# Patient Record
Sex: MALE | Race: WHITE | Employment: FULL TIME | URBAN - NONMETROPOLITAN AREA
[De-identification: names, ages, dates, MRNs, and addresses within clinical notes are randomized per-mention and may not be internally consistent; named-entity substitution may affect disease eponyms.]

---

## 2017-10-05 NOTE — PROGRESS NOTES
NPO after midnight  Bring drivers license and insurance information  Wear comfortable clean clothes  Shower morning of and night before with liquid antibacterial soap  Remove jewelry   May have to stay overnight if have PTCA/stent  Bring medications in original bottles  Made aware of visitors limit to 1 at a time  Follow all instructions given by your physician   needed at discharge    PT USES CPAP AT [facility]

## 2017-10-10 RX ORDER — DIPHENHYDRAMINE HCL 25 MG
25 TABLET ORAL
Status: CANCELLED | OUTPATIENT
Start: 2017-10-10 | End: 2017-10-10

## 2017-10-11 ENCOUNTER — HOSPITAL ENCOUNTER (OUTPATIENT)
Dept: INPATIENT UNIT | Age: 52
Discharge: HOME | End: 2017-10-11
Attending: INTERNAL MEDICINE | Admitting: INTERNAL MEDICINE
Payer: COMMERCIAL

## 2017-10-11 ENCOUNTER — APPOINTMENT (OUTPATIENT)
Dept: CARDIAC CATH/INVASIVE PROCEDURES | Age: 52
End: 2017-10-11
Attending: INTERNAL MEDICINE
Payer: COMMERCIAL

## 2017-10-11 VITALS
WEIGHT: 315 LBS | OXYGEN SATURATION: 97 % | HEIGHT: 74 IN | TEMPERATURE: 98.5 F | HEART RATE: 72 BPM | SYSTOLIC BLOOD PRESSURE: 129 MMHG | BODY MASS INDEX: 40.43 KG/M2 | DIASTOLIC BLOOD PRESSURE: 74 MMHG | RESPIRATION RATE: 17 BRPM

## 2017-10-11 PROBLEM — R07.9 CHEST PAIN AT REST: Status: ACTIVE | Noted: 2017-10-11

## 2017-10-11 PROBLEM — R94.39 ABNORMAL NUCLEAR STRESS TEST: Status: ACTIVE | Noted: 2017-10-11

## 2017-10-11 LAB
ABO: NORMAL
ALBUMIN SERPL-MCNC: 3.6 G/DL (ref 3.5–5.1)
ALP BLD-CCNC: 62 U/L (ref 38–126)
ALT SERPL-CCNC: 16 U/L (ref 11–66)
ANION GAP SERPL CALCULATED.3IONS-SCNC: 9 MEQ/L (ref 8–16)
ANTIBODY SCREEN: NORMAL
AST SERPL-CCNC: 14 U/L (ref 5–40)
BILIRUB SERPL-MCNC: 0.7 MG/DL (ref 0.3–1.2)
BUN BLDV-MCNC: 22 MG/DL (ref 7–22)
CALCIUM SERPL-MCNC: 8.5 MG/DL (ref 8.5–10.5)
CHLORIDE BLD-SCNC: 104 MEQ/L (ref 98–111)
CHOLESTEROL, TOTAL: 127 MG/DL (ref 100–199)
CO2: 27 MEQ/L (ref 23–33)
CREAT SERPL-MCNC: 0.6 MG/DL (ref 0.4–1.2)
GFR SERPL CREATININE-BSD FRML MDRD: > 90 ML/MIN/1.73M2
GLUCOSE BLD-MCNC: 92 MG/DL (ref 70–108)
HCT VFR BLD CALC: 41.6 % (ref 42–52)
HDLC SERPL-MCNC: 31 MG/DL
HEMOGLOBIN: 13.9 GM/DL (ref 14–18)
INR BLD: 1.04 (ref 0.85–1.13)
LDL CHOLESTEROL CALCULATED: 80 MG/DL
MCH RBC QN AUTO: 28.9 PG (ref 27–31)
MCHC RBC AUTO-ENTMCNC: 33.5 GM/DL (ref 33–37)
MCV RBC AUTO: 86.2 FL (ref 80–94)
PDW BLD-RTO: 15.1 % (ref 11.5–14.5)
PLATELET # BLD: 258 THOU/MM3 (ref 130–400)
PMV BLD AUTO: 8.2 MCM (ref 7.4–10.4)
POTASSIUM SERPL-SCNC: 4.5 MEQ/L (ref 3.5–5.2)
RBC # BLD: 4.82 MILL/MM3 (ref 4.7–6.1)
RH FACTOR: NORMAL
SODIUM BLD-SCNC: 140 MEQ/L (ref 135–145)
TOTAL PROTEIN: 6.5 G/DL (ref 6.1–8)
TRIGL SERPL-MCNC: 79 MG/DL (ref 0–199)
WBC # BLD: 6.6 THOU/MM3 (ref 4.8–10.8)

## 2017-10-11 PROCEDURE — 2580000003 HC RX 258: Performed by: INTERNAL MEDICINE

## 2017-10-11 PROCEDURE — C1753 CATH, INTRAVAS ULTRASOUND: HCPCS

## 2017-10-11 PROCEDURE — 6360000002 HC RX W HCPCS

## 2017-10-11 PROCEDURE — 93005 ELECTROCARDIOGRAM TRACING: CPT | Performed by: INTERNAL MEDICINE

## 2017-10-11 PROCEDURE — 36415 COLL VENOUS BLD VENIPUNCTURE: CPT

## 2017-10-11 PROCEDURE — A6258 TRANSPARENT FILM >16<=48 IN: HCPCS

## 2017-10-11 PROCEDURE — 80053 COMPREHEN METABOLIC PANEL: CPT

## 2017-10-11 PROCEDURE — 86901 BLOOD TYPING SEROLOGIC RH(D): CPT

## 2017-10-11 PROCEDURE — 93458 L HRT ARTERY/VENTRICLE ANGIO: CPT | Performed by: INTERNAL MEDICINE

## 2017-10-11 PROCEDURE — 2780000010 HC IMPLANT OTHER

## 2017-10-11 PROCEDURE — 86850 RBC ANTIBODY SCREEN: CPT

## 2017-10-11 PROCEDURE — C1894 INTRO/SHEATH, NON-LASER: HCPCS

## 2017-10-11 PROCEDURE — 96360 HYDRATION IV INFUSION INIT: CPT

## 2017-10-11 PROCEDURE — 85610 PROTHROMBIN TIME: CPT

## 2017-10-11 PROCEDURE — 80061 LIPID PANEL: CPT

## 2017-10-11 PROCEDURE — C1769 GUIDE WIRE: HCPCS

## 2017-10-11 PROCEDURE — 85027 COMPLETE CBC AUTOMATED: CPT

## 2017-10-11 PROCEDURE — 2500000003 HC RX 250 WO HCPCS

## 2017-10-11 PROCEDURE — 86900 BLOOD TYPING SEROLOGIC ABO: CPT

## 2017-10-11 RX ORDER — ACETAMINOPHEN 325 MG/1
650 TABLET ORAL EVERY 4 HOURS PRN
Status: DISCONTINUED | OUTPATIENT
Start: 2017-10-11 | End: 2017-10-11 | Stop reason: HOSPADM

## 2017-10-11 RX ORDER — ATROPINE SULFATE 0.4 MG/ML
0.5 AMPUL (ML) INJECTION
Status: DISCONTINUED | OUTPATIENT
Start: 2017-10-11 | End: 2017-10-11 | Stop reason: HOSPADM

## 2017-10-11 RX ORDER — SODIUM CHLORIDE 9 MG/ML
100 INJECTION, SOLUTION INTRAVENOUS CONTINUOUS
Status: DISCONTINUED | OUTPATIENT
Start: 2017-10-11 | End: 2017-10-11 | Stop reason: HOSPADM

## 2017-10-11 RX ORDER — SODIUM CHLORIDE 0.9 % (FLUSH) 0.9 %
10 SYRINGE (ML) INJECTION EVERY 12 HOURS SCHEDULED
Status: DISCONTINUED | OUTPATIENT
Start: 2017-10-11 | End: 2017-10-11 | Stop reason: HOSPADM

## 2017-10-11 RX ORDER — ASPIRIN 325 MG
325 TABLET ORAL ONCE
Status: DISCONTINUED | OUTPATIENT
Start: 2017-10-11 | End: 2017-10-11 | Stop reason: HOSPADM

## 2017-10-11 RX ORDER — SODIUM CHLORIDE 0.9 % (FLUSH) 0.9 %
10 SYRINGE (ML) INJECTION PRN
Status: DISCONTINUED | OUTPATIENT
Start: 2017-10-11 | End: 2017-10-11 | Stop reason: HOSPADM

## 2017-10-11 RX ORDER — SODIUM CHLORIDE 0.9 % (FLUSH) 0.9 %
10 SYRINGE (ML) INJECTION PRN
Status: DISCONTINUED | OUTPATIENT
Start: 2017-10-11 | End: 2017-10-11 | Stop reason: SDUPTHER

## 2017-10-11 RX ORDER — NAPROXEN SODIUM 550 MG/1
550 TABLET ORAL 4 TIMES DAILY PRN
COMMUNITY

## 2017-10-11 RX ORDER — ONDANSETRON 2 MG/ML
4 INJECTION INTRAMUSCULAR; INTRAVENOUS EVERY 6 HOURS PRN
Status: DISCONTINUED | OUTPATIENT
Start: 2017-10-11 | End: 2017-10-11 | Stop reason: HOSPADM

## 2017-10-11 RX ORDER — SODIUM CHLORIDE 0.9 % (FLUSH) 0.9 %
10 SYRINGE (ML) INJECTION EVERY 12 HOURS SCHEDULED
Status: DISCONTINUED | OUTPATIENT
Start: 2017-10-11 | End: 2017-10-11 | Stop reason: SDUPTHER

## 2017-10-11 RX ORDER — SODIUM CHLORIDE 9 MG/ML
INJECTION, SOLUTION INTRAVENOUS CONTINUOUS
Status: DISCONTINUED | OUTPATIENT
Start: 2017-10-11 | End: 2017-10-11 | Stop reason: HOSPADM

## 2017-10-11 RX ORDER — DIPHENHYDRAMINE HYDROCHLORIDE 50 MG/ML
50 INJECTION INTRAMUSCULAR; INTRAVENOUS ONCE
Status: DISCONTINUED | OUTPATIENT
Start: 2017-10-11 | End: 2017-10-11

## 2017-10-11 RX ADMIN — SODIUM CHLORIDE: 9 INJECTION, SOLUTION INTRAVENOUS at 06:57

## 2017-10-11 NOTE — PLAN OF CARE
Problem: Preoperative Routine:  Goal: Risk for injury before, during, or after surgery or procedure will decrease  Risk for injury before, during, or after surgery or procedure will decrease   Outcome: Completed Date Met: 10/11/17  Procedure tolerated well    Problem: Discharge Planning:  Goal: Discharged to appropriate level of care  Discharged to appropriate level of care   Outcome: Completed Date Met: 10/11/17  Discharged home    Problem: Falls - Risk of  Goal: Absence of falls  Outcome: Completed Date Met: 10/11/17  No falls

## 2017-10-11 NOTE — H&P
[address] [address]   Sedation/Analgesia History and Physical    Pt Name: Chantel Agee  MRN: [id number]  YOB: 1965  Provider Performing Procedure: Meghna Escobedo MD  Primary Care Physician: Nadia Davis MD      Pre-Procedure: Chest pain, possible coronary artery disease, abnormal stress test    Consent: I have discussed with the patient and/or the patient representative the indication, alternatives, and the possible risks and/or complications of the planned procedure and the anesthesia methods. The patient and/or representative appear to understand and agree to proceed. Medical History:   has a past medical history of Asthma; Atrial fibrillation ([address] GERD (gastroesophageal reflux disease); Hypertension; and Sleep apnea. .    Surgical History:     has a past surgical history that includes Colonoscopy (09/20/2016). Allergies: Allergies as of 10/11/2017    (No Known Allergies)       Medications:   Coumadin use last 5 days:  No  Antiplatelet drug therapy use last 5 days:  Yes  Other anticoagulant use last 5 days:  No   sodium chloride flush  10 mL Intravenous 2 times per day    aspirin  325 mg Oral Once    diphenhydrAMINE  50 mg Intravenous Once    hydrocortisone sodium succinate PF  200 mg Intravenous Once     Prior to Admission medications    Medication Sig Start Date End Date Taking? Authorizing Provider   naproxen (NAPROSYN) 375 MG tablet Take 375 mg by mouth 2 times daily (with meals)    Historical Provider, MD   fluticasone-salmeterol (ADVAIR) 250-50 MCG/DOSE AEPB Inhale 1 puff into the lungs 2 times daily. Historical Provider, MD   metoprolol (TOPROL-XL) 50 MG XL tablet Take 75 mg by mouth daily     Historical Provider, MD   omeprazole (PRILOSEC) 20 MG capsule Take 20 mg by mouth Daily. Historical Provider, MD   aspirin 325 MG EC tablet Take 325 mg by mouth daily.     Historical Provider, MD   naproxen (NAPROSYN) 375 MG tablet Take 1 tablet by mouth 2 times daily

## 2017-10-11 NOTE — PROGRESS NOTES
Inpatient Cardiac Rehabilitation Consult    Received consult for Phase II Cardiac Rehabilitation. Post procedure notes state that only a LHC was done. Does not qualify for CR at this time. Will follow though.

## 2017-10-11 NOTE — PROGRESS NOTES
Returned to [phone]. Monitor attached showing SB. Vasc-band to right wrist, hemostasis maintained.   0.9nss infusing with approx 800ml remaining

## 2017-10-11 NOTE — PROGRESS NOTES
Pt admitted to  2E16 ambulatory for cardiac cath  Pt NPO. Patient accompanied by family  Vital signs obtained. Assessment and data collection initiated. Oriented to room. Policies and procedures for 2E explained   All questions answered with no further questions at this time. Fall prevention and safety precautions discussed with patient.

## 2017-10-12 LAB
EKG ATRIAL RATE: 64 BPM
EKG P AXIS: 41 DEGREES
EKG P-R INTERVAL: 216 MS
EKG Q-T INTERVAL: 392 MS
EKG QRS DURATION: 88 MS
EKG QTC CALCULATION (BAZETT): 404 MS
EKG R AXIS: 77 DEGREES
EKG T AXIS: 37 DEGREES
EKG VENTRICULAR RATE: 64 BPM

## 2017-10-16 NOTE — PROCEDURES
second  diagonal.    This patient's ventriculogram showed a preserved systolic function of  the left ventricle, ejection fraction about 55% to 60%. Left  ventricular end-diastolic pressure was 29, consistent with severe  diastolic dysfunction of the left ventricle. No mitral valve  regurgitation. No gradient across the aortic valve. IMPRESSION:  1. Procedure performed under IV conscious sedation. No acute  complications from the procedure. 2.  Preserved systolic function of the left ventricle, ejection  fraction around 55%. No mitral regurgitation. No gradient across the  aortic valve. 3.  Diastolic dysfunction of the left ventricle, left ventricular  end-diastolic pressure was 29.  4.  Nonobstructive disease about 40% to 50% in mid LAD. 5.  Patent RCA. 6.  Patent circumflex. 7.  Successful deployment of the Angio-Seal closure device. At this point, we recommend to continue with aggressive medical  treatment, risk factor modification, and periodic followup.         Xenia Moreno M.D.    D: 10/16/2017 6:44:40       T: 10/16/2017 8:21:41     [email or web address]  Job#: [id number]     Doc#: [id number]

## 2022-04-13 PROBLEM — I10 HYPERTENSION: Status: ACTIVE | Noted: 2022-04-13

## 2022-04-13 PROBLEM — K21.9 ACID REFLUX: Status: ACTIVE | Noted: 2022-04-13

## 2022-05-31 RX ORDER — METOPROLOL SUCCINATE 50 MG/1
TABLET, EXTENDED RELEASE ORAL
Qty: 90 TABLET | Refills: 4 | OUTPATIENT
Start: 2022-05-31

## 2023-01-10 ENCOUNTER — OFFICE VISIT (OUTPATIENT)
Dept: CARDIOLOGY CLINIC | Age: 58
End: 2023-01-10
Payer: COMMERCIAL

## 2023-01-10 VITALS
RESPIRATION RATE: 18 BRPM | SYSTOLIC BLOOD PRESSURE: 166 MMHG | HEART RATE: 57 BPM | HEIGHT: 74 IN | DIASTOLIC BLOOD PRESSURE: 97 MMHG | BODY MASS INDEX: 39.14 KG/M2 | WEIGHT: 305 LBS

## 2023-01-10 DIAGNOSIS — I10 HYPERTENSION, UNSPECIFIED TYPE: Primary | ICD-10-CM

## 2023-01-10 PROCEDURE — 93000 ELECTROCARDIOGRAM COMPLETE: CPT | Performed by: INTERNAL MEDICINE

## 2023-01-10 PROCEDURE — 3077F SYST BP >= 140 MM HG: CPT | Performed by: INTERNAL MEDICINE

## 2023-01-10 PROCEDURE — 3080F DIAST BP >= 90 MM HG: CPT | Performed by: INTERNAL MEDICINE

## 2023-01-10 PROCEDURE — 99214 OFFICE O/P EST MOD 30 MIN: CPT | Performed by: INTERNAL MEDICINE

## 2023-01-10 RX ORDER — LOSARTAN POTASSIUM 50 MG/1
50 TABLET ORAL DAILY
Qty: 90 TABLET | Refills: 3 | Status: SHIPPED | OUTPATIENT
Start: 2023-01-10

## 2023-01-10 RX ORDER — SPIRONOLACTONE 25 MG/1
50 TABLET ORAL DAILY
Qty: 90 TABLET | Refills: 3 | Status: SHIPPED | OUTPATIENT
Start: 2023-01-10

## 2023-01-10 RX ORDER — METOPROLOL SUCCINATE 50 MG/1
75 TABLET, EXTENDED RELEASE ORAL DAILY
Qty: 90 TABLET | Refills: 3 | Status: SHIPPED | OUTPATIENT
Start: 2023-01-10

## 2023-01-10 ASSESSMENT — ENCOUNTER SYMPTOMS
VOICE CHANGE: 0
ANAL BLEEDING: 0
WHEEZING: 0
ABDOMINAL DISTENTION: 0
BLOOD IN STOOL: 0
APNEA: 0
ABDOMINAL PAIN: 0
CHOKING: 0
COUGH: 0
TROUBLE SWALLOWING: 0
COLOR CHANGE: 0
VOMITING: 0
CHEST TIGHTNESS: 0
SHORTNESS OF BREATH: 1
NAUSEA: 0
STRIDOR: 0

## 2023-01-10 NOTE — PROGRESS NOTES
[address] [facility]  [place] [address]  Dept: [phone]  Loc: [phone]     1/10/2023       Narcisa Staggers is here today for   Chief Complaint   Patient presents with    Follow-up           Referring Physician:  No ref. provider found     Patient Active Problem List   Diagnosis    Chest pain at rest    Abnormal nuclear stress test    Hypertension    Acid reflux       Review of Systems   Constitutional:  Negative for activity change, appetite change, fatigue, fever and unexpected weight change. HENT:  Negative for congestion, trouble swallowing and voice change. Eyes:  Negative for visual disturbance. Respiratory:  Positive for shortness of breath. Negative for apnea, cough, choking, chest tightness, wheezing and stridor. Cardiovascular:  Positive for palpitations. Negative for chest pain and leg swelling. Gastrointestinal:  Negative for abdominal distention, abdominal pain, anal bleeding, blood in stool, nausea and vomiting. Endocrine: Negative for cold intolerance and heat intolerance. Genitourinary:  Negative for hematuria. Musculoskeletal:  Negative for arthralgias, gait problem, joint swelling and myalgias. Skin:  Negative for color change and rash. Allergic/Immunologic: Negative for environmental allergies and food allergies. Neurological:  Negative for dizziness, tremors, syncope, facial asymmetry, weakness, light-headedness, numbness and headaches. Hematological:  Does not bruise/bleed easily. Psychiatric/Behavioral:  Negative for agitation, behavioral problems and sleep disturbance.        Past Medical History:   Diagnosis Date    Asthma     Atrial fibrillation (HCC)     GERD (gastroesophageal reflux disease)     Hyperlipidemia     Hypertension     Obesity     Sleep apnea        No Known Allergies    Current Outpatient Medications   Medication Sig Dispense Refill    metoprolol succinate (TOPROL XL) 50 MG extended release tablet Take 1.5 tablets by mouth daily 90 tablet 3    losartan (COZAAR) 50 MG tablet Take 1 tablet by mouth daily 90 tablet 3    spironolactone (ALDACTONE) 25 MG tablet Take 2 tablets by mouth daily 90 tablet 3    Aspirin 81 MG CAPS 81 mg (Patient not taking: Reported on 1/10/2023)      aspirin 325 MG EC tablet Take 325 mg by mouth daily. (Patient not taking: Reported on 1/10/2023)       No current facility-administered medications for this visit. Social History     Socioeconomic History    Marital status:      Spouse name: None    Number of children: None    Years of education: None    Highest education level: None   Tobacco Use    Smoking status: Never    Smokeless tobacco: Never   Substance and Sexual Activity    Alcohol use: Yes     Comment: RARELY    Drug use: No    Sexual activity: Yes       History reviewed. No pertinent family history. Blood pressure (!) 166/97, pulse 57, resp. rate 18, height 6' 2\" (1.88 m), weight (!) 305 lb (138.3 kg).     Physical Exam:    General Appearance: alert and oriented to person, place and time, in no acute distress  Cardiovascular: normal rate, regular rhythm, normal S1 and S2, no murmurs, rubs, clicks, or gallops, distal pulses intact, no carotid bruits, no JVD  Pulmonary/Chest: clear to auscultation bilaterally- no wheezes, rales or rhonchi, normal air movement, no respiratory distress  Abdomen: soft, non-tender, non-distended, normal bowel sounds, no masses   Extremities: no cyanosis, clubbing or edema, pulse   Skin: warm and dry, no rash or erythema  Head: normocephalic and atraumatic  Eyes: pupils equal, round, and reactive to light  Neck: supple and non-tender without mass, no thyromegaly   Musculoskeletal: normal range of motion, no joint swelling, deformity or tenderness  Neurological: alert, oriented, normal speech, no focal findings or movement disorder noted    Lab Data:    Cardiac Enzymes:  No results for input(s): CKTOTAL, CKMB, CKMBINDEX, TROPONINI in the last 72 hours. CBC:   Lab Results   Component Value Date/Time    WBC 6.6 10/11/2017 06:42 AM    RBC 4.82 10/11/2017 06:42 AM    HGB 13.9 10/11/2017 06:42 AM    HCT 41.6 10/11/2017 06:42 AM     10/11/2017 06:42 AM       CMP:    Lab Results   Component Value Date/Time     10/11/2017 06:42 AM    K 4.5 10/11/2017 06:42 AM     10/11/2017 06:42 AM    CO2 27 10/11/2017 06:42 AM    BUN 22 10/11/2017 06:42 AM    CREATININE 0.6 10/11/2017 06:42 AM    LABGLOM >90 10/11/2017 06:42 AM    GLUCOSE 92 10/11/2017 06:42 AM    CALCIUM 8.5 10/11/2017 06:42 AM       Hepatic Function Panel:    Lab Results   Component Value Date/Time    ALKPHOS 62 10/11/2017 06:42 AM    ALT 16 10/11/2017 06:42 AM    AST 14 10/11/2017 06:42 AM    PROT 6.5 10/11/2017 06:42 AM    BILITOT 0.7 10/11/2017 06:42 AM    LABALBU 3.6 10/11/2017 06:42 AM       Magnesium:  No results found for: MG    PT/INR:    Lab Results   Component Value Date/Time    INR 1.04 10/11/2017 06:42 AM       HgBA1c:  No results found for: LABA1C    FLP:    Lab Results   Component Value Date/Time    TRIG 79 10/11/2017 06:42 AM    HDL 31 10/11/2017 06:42 AM    LDLCALC 80 10/11/2017 06:42 AM       TSH:  No results found for: TSH     Diagnosis Orders   1. Hypertension, unspecified type  41768 - [place] ELECTROCARDIOGRAM, COMPLETE    Basic Metabolic Panel           Assessment/Plan    Carl is a 65years old gentleman who has a history of obesity and gastric bypass surgery he gained about 19 pounds since his last visit. His blood pressure is going high. He has episode of atrial fibrillation. The patient had history of sleep apnea has not been using his CPAP. He denies chest pain. Say we had a long discussion about the importance of compliance diet exercise and risk factor modifications. The patient was giving Cozaar 50 mg and Aldactone 25 mg he will have a BMP in 4 weeks and he will be seen back in 8 weeks.   He is to keep a record of the if you develop with the atrial fibs and to keep a record of his blood pressure readings. He was advised about utilizing the CPAP again he verbalized he understands and he promised to change his lifestyle and be better and compliance. He will be seen in 8 weeks he is to seek medical attention for any change in clinical condition. His lab work was discussed with him and his EKG findings were discussed with him and he is to seek medical attention for any change in clinical condition thank    Orders Placed This Encounter   Procedures    Basic Metabolic Panel     Standing Status:   Future     Standing Expiration Date:   1/10/2024    93552 - [place] ELECTROCARDIOGRAM, COMPLETE       Return in about 8 weeks (around 3/7/2023) for htn.      Smooth Kamara MD

## 2023-02-17 RX ORDER — SPIRONOLACTONE 25 MG/1
TABLET ORAL
Qty: 90 TABLET | Refills: 3 | OUTPATIENT
Start: 2023-02-17

## 2023-03-06 RX ORDER — METOPROLOL SUCCINATE 50 MG/1
TABLET, EXTENDED RELEASE ORAL
Qty: 145 TABLET | Refills: 3 | Status: SHIPPED | OUTPATIENT
Start: 2023-03-06

## 2023-03-14 ENCOUNTER — OFFICE VISIT (OUTPATIENT)
Dept: CARDIOLOGY CLINIC | Age: 58
End: 2023-03-14
Payer: COMMERCIAL

## 2023-03-14 VITALS
SYSTOLIC BLOOD PRESSURE: 158 MMHG | BODY MASS INDEX: 36.83 KG/M2 | RESPIRATION RATE: 18 BRPM | HEART RATE: 53 BPM | DIASTOLIC BLOOD PRESSURE: 94 MMHG | HEIGHT: 74 IN | WEIGHT: 287 LBS

## 2023-03-14 DIAGNOSIS — I10 HYPERTENSION, UNSPECIFIED TYPE: Primary | ICD-10-CM

## 2023-03-14 DIAGNOSIS — G47.33 OSA (OBSTRUCTIVE SLEEP APNEA): ICD-10-CM

## 2023-03-14 PROCEDURE — 3077F SYST BP >= 140 MM HG: CPT | Performed by: NURSE PRACTITIONER

## 2023-03-14 PROCEDURE — 93000 ELECTROCARDIOGRAM COMPLETE: CPT | Performed by: INTERNAL MEDICINE

## 2023-03-14 PROCEDURE — 3080F DIAST BP >= 90 MM HG: CPT | Performed by: NURSE PRACTITIONER

## 2023-03-14 PROCEDURE — 99214 OFFICE O/P EST MOD 30 MIN: CPT | Performed by: NURSE PRACTITIONER

## 2023-03-14 ASSESSMENT — ENCOUNTER SYMPTOMS
RESPIRATORY NEGATIVE: 1
GASTROINTESTINAL NEGATIVE: 1

## 2023-03-14 NOTE — PROGRESS NOTES
[address] [facility] [address]  Dept: [address]: [phone]           Chief Complaint   Patient presents with    Follow-up       Cardiologist:  Dr. Joseline Pendleton      Today's visit: 3/14/2023    Subjective:    Review of Systems   Constitutional: Negative. Respiratory: Negative. Cardiovascular: Negative. Gastrointestinal: Negative. Musculoskeletal: Negative. Neurological: Negative. Psychiatric/Behavioral: Negative. Past Medical History:   Diagnosis Date    Asthma     Atrial fibrillation (HCC)     GERD (gastroesophageal reflux disease)     Hyperlipidemia     Hypertension     Obesity     Sleep apnea        No Known Allergies    Current Outpatient Medications   Medication Sig Dispense Refill    metoprolol succinate (TOPROL XL) 50 MG extended release tablet TAKE 1 AND 1/2 TABLETS BY MOUTH EVERY DAY (Patient taking differently: 50 mg daily) 145 tablet 3    losartan (COZAAR) 50 MG tablet Take 1 tablet by mouth daily 90 tablet 3    spironolactone (ALDACTONE) 25 MG tablet Take 2 tablets by mouth daily 90 tablet 3    Aspirin 81 MG CAPS 81 mg (Patient not taking: No sig reported)      aspirin 325 MG EC tablet Take 325 mg by mouth daily. (Patient not taking: No sig reported)       No current facility-administered medications for this visit. Social History     Socioeconomic History    Marital status:      Spouse name: None    Number of children: None    Years of education: None    Highest education level: None   Tobacco Use    Smoking status: Never    Smokeless tobacco: Never   Substance and Sexual Activity    Alcohol use: Yes     Comment: RARELY    Drug use: No    Sexual activity: Yes       History reviewed. No pertinent family history. Blood pressure (!) 158/94, pulse 53, resp. rate 18, height 6' 2\" (1.88 m), weight 287 lb (130.2 kg).       Physical Exam  Constitutional: Appearance: Normal appearance. Cardiovascular:      Rate and Rhythm: Normal rate and regular rhythm. Heart sounds: Normal heart sounds. Pulmonary:      Effort: Pulmonary effort is normal.      Breath sounds: Normal breath sounds. Abdominal:      General: Bowel sounds are normal.      Palpations: Abdomen is soft. Musculoskeletal:         General: Normal range of motion. Skin:     General: Skin is warm and dry. Neurological:      General: No focal deficit present. Mental Status: He is alert and oriented to person, place, and time. Psychiatric:         Mood and Affect: Mood normal.         Behavior: Behavior normal.         EKG: normal sinus rhythm, nonspecific ST and T waves changes, unchanged from previous tracings. Diagnosis Orders   1. Hypertension, unspecified type  44838 - [place] ELECTROCARDIOGRAM, COMPLETE      2. AMANDA (obstructive sleep apnea)              Assessment and Plan:  Carl is a pleasant 28-year-old gentleman who presents today for an 8-week follow-up for hypertension. Carl states that he is taking his medication without any problems. He states his blood pressures at home running around 130s over 70s. Today is a little elevated at 158/94. He denies any type of dizziness headaches palpitations or chest discomfort. Carl states that he is not wearing his CPAP as asked by Dr. Angie Barrera last time he knows the consequences of not wearing it 10 to 20 years down the line. We did discuss different options that they have nowadays besides the CPAP. He was encouraged to use a CPAP or seek other options with his pulmonologist.  His lab work was reviewed and it looks well. Uncontrolled HTN -improved blood pressures running 130 over over 70s at home    PAFB -sinus rhythm today not on anticoagulation    AMANDA - not using CPAP -continues to not use his CPAP.   Did discuss different options with him that he can discuss with his pulmonologist.    Salma Acosta is doing well from a cardiac standpoint his blood pressure seems to be well controlled at home. He understands to call us if his blood pressures are above 309 systolic for period of time. He also understands to call us for any chest discomfort. I did agree to see him in 1 years time or sooner if he has any issues. I also encouraged him to go see his pulmonologist to discuss different types of PAP therapy for his sleep apnea. Orders Placed This Encounter   Procedures    Orlean Lover Dr Sonya Ray current treatment plan    There are no Patient Instructions on file for this visit. Return in about 1 year (around 3/14/2024), or if symptoms worsen or fail to improve, for HTN.     Follow up with Dr Doug Monsivais as scheduled or sooner if needed    Erendira Mere, KORY - CNP

## 2023-05-22 RX ORDER — SPIRONOLACTONE 25 MG/1
TABLET ORAL
Qty: 180 TABLET | Refills: 2 | Status: SHIPPED | OUTPATIENT
Start: 2023-05-22

## 2023-08-02 ENCOUNTER — TELEPHONE (OUTPATIENT)
Dept: CARDIOLOGY CLINIC | Age: 58
End: 2023-08-02

## 2023-08-02 DIAGNOSIS — I48.91 ATRIAL FIBRILLATION, UNSPECIFIED TYPE (HCC): Primary | ICD-10-CM

## 2023-08-02 NOTE — TELEPHONE ENCOUNTER
Called Carl to let him know Dr. Charisma Hernandez wanted him to have a 24 hour holter monitor. Patient said that the atrial fibrillation went away. He matty call us if it comes back.

## 2023-08-02 NOTE — TELEPHONE ENCOUNTER
Carl called wanting to get a sooner apt due to atrial fibrilation. Dr. Jennifer Man ordered 24 hour holter monitor.

## 2024-01-05 RX ORDER — LOSARTAN POTASSIUM 50 MG/1
50 TABLET ORAL DAILY
Qty: 90 TABLET | Refills: 0 | Status: SHIPPED | OUTPATIENT
Start: 2024-01-05

## 2024-01-12 ENCOUNTER — OFFICE VISIT (OUTPATIENT)
Dept: FAMILY MEDICINE CLINIC | Age: 59
End: 2024-01-12
Payer: COMMERCIAL

## 2024-01-12 VITALS
WEIGHT: 315 LBS | OXYGEN SATURATION: 97 % | RESPIRATION RATE: 16 BRPM | SYSTOLIC BLOOD PRESSURE: 112 MMHG | TEMPERATURE: 97.9 F | BODY MASS INDEX: 40.43 KG/M2 | HEART RATE: 63 BPM | HEIGHT: 74 IN | DIASTOLIC BLOOD PRESSURE: 72 MMHG

## 2024-01-12 DIAGNOSIS — B00.1 RECURRENT COLD SORES: ICD-10-CM

## 2024-01-12 DIAGNOSIS — H65.01 RIGHT ACUTE SEROUS OTITIS MEDIA, RECURRENCE NOT SPECIFIED: Primary | ICD-10-CM

## 2024-01-12 PROCEDURE — 3078F DIAST BP <80 MM HG: CPT | Performed by: STUDENT IN AN ORGANIZED HEALTH CARE EDUCATION/TRAINING PROGRAM

## 2024-01-12 PROCEDURE — 3074F SYST BP LT 130 MM HG: CPT | Performed by: STUDENT IN AN ORGANIZED HEALTH CARE EDUCATION/TRAINING PROGRAM

## 2024-01-12 PROCEDURE — 99203 OFFICE O/P NEW LOW 30 MIN: CPT | Performed by: STUDENT IN AN ORGANIZED HEALTH CARE EDUCATION/TRAINING PROGRAM

## 2024-01-12 RX ORDER — AMOXICILLIN AND CLAVULANATE POTASSIUM 875; 125 MG/1; MG/1
1 TABLET, FILM COATED ORAL 2 TIMES DAILY
Qty: 14 TABLET | Refills: 0 | Status: SHIPPED | OUTPATIENT
Start: 2024-01-12 | End: 2024-01-19

## 2024-01-12 RX ORDER — VALACYCLOVIR HYDROCHLORIDE 1 G/1
2000 TABLET, FILM COATED ORAL 2 TIMES DAILY
Qty: 20 TABLET | Refills: 1 | Status: SHIPPED | OUTPATIENT
Start: 2024-01-12 | End: 2024-01-17

## 2024-01-12 SDOH — ECONOMIC STABILITY: FOOD INSECURITY: WITHIN THE PAST 12 MONTHS, THE FOOD YOU BOUGHT JUST DIDN'T LAST AND YOU DIDN'T HAVE MONEY TO GET MORE.: NEVER TRUE

## 2024-01-12 SDOH — ECONOMIC STABILITY: HOUSING INSECURITY
IN THE LAST 12 MONTHS, WAS THERE A TIME WHEN YOU DID NOT HAVE A STEADY PLACE TO SLEEP OR SLEPT IN A SHELTER (INCLUDING NOW)?: NO

## 2024-01-12 SDOH — ECONOMIC STABILITY: INCOME INSECURITY: HOW HARD IS IT FOR YOU TO PAY FOR THE VERY BASICS LIKE FOOD, HOUSING, MEDICAL CARE, AND HEATING?: NOT HARD AT ALL

## 2024-01-12 SDOH — ECONOMIC STABILITY: FOOD INSECURITY: WITHIN THE PAST 12 MONTHS, YOU WORRIED THAT YOUR FOOD WOULD RUN OUT BEFORE YOU GOT MONEY TO BUY MORE.: NEVER TRUE

## 2024-01-12 ASSESSMENT — ENCOUNTER SYMPTOMS
COUGH: 1
SINUS PAIN: 0
SINUS PRESSURE: 0
CONSTIPATION: 0
TROUBLE SWALLOWING: 0
SHORTNESS OF BREATH: 0
VOMITING: 0
DIARRHEA: 0
NAUSEA: 0
SORE THROAT: 0

## 2024-01-12 NOTE — PROGRESS NOTES
[facility] PROFESSIONAL [facility]S  [facility]  [address] [facility] [address]  [phone]     Cral Paulson is a 58 y.o. male who presents today for:  Chief Complaint   Patient presents with    Congestion     3-4 days, congestion, cough.  Denies body aches and hot flashes.  Has taken ibuprofen and Advil.     Otalgia     Right ear pain and lost hearing       Assessment/Plan:     Carl was seen today for congestion and otalgia.    Diagnoses and all orders for this visit:    Right acute serous otitis media, recurrence not specified  -     amoxicillin-clavulanate (AUGMENTIN) 875-125 MG per tablet; Take 1 tablet by mouth 2 times daily for 7 days    Recurrent cold sores  -     valACYclovir (VALTREX) 1 g tablet; Take 2 tablets by mouth 2 times daily for 5 days      Ear exam concerning for otitis media on the right side, will prescribe antibiotics as above.  Otherwise recommended supportive care as below    Symptomatic therapy suggested: gargle for sore throat, use mist at bedside for congestion.  Apply facial warm packs for sinus pain. Recommend increased hydration, small frequent meals, and resting when able.     For cough/congestion either Mucinex DM or Robitussin DM, take dose as recommended on bottle.     For ear fullness/drainage/post nasal drip Flonase 2 puffs per nostril twice a day for one week then once at night for one week.    For ear pain/fullness can also ask for Sudafed behind the counter at the pharmacy. I recommend taking every 12 hours until ear pain/hearing improves. Popping is a good sign.        No follow-ups on file.      Medications Prescribed:  Orders Placed This Encounter   Medications    amoxicillin-clavulanate (AUGMENTIN) 875-125 MG per tablet     Sig: Take 1 tablet by mouth 2 times daily for 7 days     Dispense:  14 tablet     Refill:  0    valACYclovir (VALTREX) 1 g tablet     Sig: Take 2 tablets by mouth 2 times daily for 5 days     Dispense:  20 tablet

## 2024-01-12 NOTE — PATIENT INSTRUCTIONS
Symptomatic therapy suggested: gargle for sore throat, use mist at bedside for congestion.  Apply facial warm packs for sinus pain. Recommend increased hydration, small frequent meals, and resting when able.     For cough/congestion either Mucinex DM or Robitussin DM, take dose as recommended on bottle.     For ear fullness/drainage/post nasal drip Flonase 2 puffs per nostril twice a day for one week then once at night for one week.    For ear pain/fullness can also ask for Sudafed behind the counter at the pharmacy. I recommend taking every 12 hours until ear pain/hearing improves. Popping is a good sign.

## 2024-02-12 RX ORDER — SPIRONOLACTONE 25 MG/1
TABLET ORAL
Qty: 180 TABLET | Refills: 0 | Status: SHIPPED | OUTPATIENT
Start: 2024-02-12

## 2024-02-29 ENCOUNTER — TELEPHONE (OUTPATIENT)
Dept: CARDIOLOGY CLINIC | Age: 59
End: 2024-02-29

## 2024-02-29 DIAGNOSIS — I48.91 ATRIAL FIBRILLATION, UNSPECIFIED TYPE (HCC): Primary | ICD-10-CM

## 2024-02-29 DIAGNOSIS — R07.9 CHEST PAIN AT REST: ICD-10-CM

## 2024-02-29 DIAGNOSIS — I10 HYPERTENSION, UNSPECIFIED TYPE: ICD-10-CM

## 2024-02-29 NOTE — TELEPHONE ENCOUNTER
Pt has a BMP in chart.  BMP .  Will place new labs and fax to [facility].    Spoke with pt.  Pt voiced understanding.

## 2024-02-29 NOTE — TELEPHONE ENCOUNTER
Patient had been a patient with Dr Miles and he is coming in to get established with Dr Hugo on 3/19/2024.  He thought Dr Miles had given him blood work orders but the does not have them.  He is asking if blood work orders can be faxed to [facility] in [place] for him to have prior to the appt? Or does he have to wait until he is seen?  Please advise.

## 2024-03-19 ENCOUNTER — OFFICE VISIT (OUTPATIENT)
Dept: CARDIOLOGY CLINIC | Age: 59
End: 2024-03-19
Payer: COMMERCIAL

## 2024-03-19 VITALS
WEIGHT: 314 LBS | DIASTOLIC BLOOD PRESSURE: 70 MMHG | SYSTOLIC BLOOD PRESSURE: 134 MMHG | HEIGHT: 74 IN | BODY MASS INDEX: 40.3 KG/M2 | HEART RATE: 67 BPM

## 2024-03-19 DIAGNOSIS — I48.91 ATRIAL FIBRILLATION, UNSPECIFIED TYPE (HCC): Primary | ICD-10-CM

## 2024-03-19 PROCEDURE — 99204 OFFICE O/P NEW MOD 45 MIN: CPT | Performed by: INTERNAL MEDICINE

## 2024-03-19 PROCEDURE — 93000 ELECTROCARDIOGRAM COMPLETE: CPT | Performed by: INTERNAL MEDICINE

## 2024-03-19 PROCEDURE — 3075F SYST BP GE 130 - 139MM HG: CPT | Performed by: INTERNAL MEDICINE

## 2024-03-19 PROCEDURE — 3078F DIAST BP <80 MM HG: CPT | Performed by: INTERNAL MEDICINE

## 2024-03-19 RX ORDER — SPIRONOLACTONE 25 MG/1
50 TABLET ORAL DAILY
Qty: 180 TABLET | Refills: 3 | Status: SHIPPED | OUTPATIENT
Start: 2024-03-19

## 2024-03-19 RX ORDER — ASPIRIN 81 MG/1
81 TABLET ORAL DAILY
Qty: 90 TABLET | Refills: 0 | Status: SHIPPED | OUTPATIENT
Start: 2024-03-19

## 2024-03-19 RX ORDER — LOSARTAN POTASSIUM 50 MG/1
50 TABLET ORAL DAILY
Qty: 90 TABLET | Refills: 3 | Status: SHIPPED | OUTPATIENT
Start: 2024-03-19

## 2024-03-19 RX ORDER — MELOXICAM 15 MG/1
15 TABLET ORAL DAILY
COMMUNITY
Start: 2024-02-01

## 2024-03-19 RX ORDER — METOPROLOL SUCCINATE 50 MG/1
50 TABLET, EXTENDED RELEASE ORAL DAILY
Qty: 90 TABLET | Refills: 3 | Status: SHIPPED | OUTPATIENT
Start: 2024-03-19

## 2024-03-19 NOTE — PROGRESS NOTES
[facility] [place] [facility]  [facility] CARDIOLOGY  [address]  [address]  [facility] [address]  Dept: [phone]  Dept Fax: [phone]  Loc: [phone]    Visit Date: 3/19/2024    Mr. Paulson is a 58 y.o. male  who presented for:  New patient   Establish cardiac care   HTN  A fib   cad  HPI:   HPI   Carl Paulson is a pleasant 58 year old male patient who  has a past medical history of Asthma, Atrial fibrillation (HCC), GERD (gastroesophageal reflux disease), Hyperlipidemia, Hypertension, Obesity, and Sleep apnea. He used to follow with Dr Miles, wishes to establish cardiac care.  Echocardiogram in 2017 revealed a preserved EF. LHC in 2017 revealed nonobstructive CAD. He has h/o paroxysmal atrial fibrillation, not on OAC, chadsvasc score 1, no know recent recurrence. Denies h/o TIA or CVA. Patient denies chest pain, shortness of breath, dyspnea on exertion. No palpitations.       Current Outpatient Medications:     spironolactone (ALDACTONE) 25 MG tablet, TAKE 2 TABLETS BY MOUTH EVERY DAY, Disp: 180 tablet, Rfl: 0    losartan (COZAAR) 50 MG tablet, TAKE 1 TABLET BY MOUTH EVERY DAY, Disp: 90 tablet, Rfl: 0    metoprolol succinate (TOPROL XL) 50 MG extended release tablet, TAKE 1 AND 1/2 TABLETS BY MOUTH EVERY DAY (Patient taking differently: 1 tablet daily), Disp: 145 tablet, Rfl: 3    aspirin 325 MG EC tablet, Take 325 mg by mouth daily. (Patient not taking: No sig reported), Disp: , Rfl:     Past Medical History  Carl  has a past medical history of Asthma, Atrial fibrillation (HCC), GERD (gastroesophageal reflux disease), Hyperlipidemia, Hypertension, Obesity, and Sleep apnea.    Social History  Carl  reports that he has never smoked. He has never used smokeless tobacco. He reports current alcohol use. He reports that he does not use drugs.    Family History  Carl family history is not on file.    Past Surgical History   Past Surgical History:   Procedure Laterality Date

## 2024-03-19 NOTE — PROGRESS NOTES
New patient here for check up former Dr. Miles pt     Pt states no c/o     EKG done today     Pt states is taking metoprolol 50 daily

## 2024-04-04 RX ORDER — METOPROLOL SUCCINATE 50 MG/1
TABLET, EXTENDED RELEASE ORAL
Qty: 145 TABLET | Refills: 3 | OUTPATIENT
Start: 2024-04-04

## 2025-02-24 ENCOUNTER — TELEPHONE (OUTPATIENT)
Dept: ADMINISTRATIVE | Age: 60
End: 2025-02-24

## 2025-02-24 DIAGNOSIS — I10 HYPERTENSION, UNSPECIFIED TYPE: Primary | ICD-10-CM

## 2025-02-24 DIAGNOSIS — R07.9 CHEST PAIN AT REST: ICD-10-CM

## 2025-02-24 NOTE — TELEPHONE ENCOUNTER
Pt is calling and wants to see if Dr Hugo will want labs done prior to his 3/13/2025 appt.  He gets them done at [facility].  Please advise pt at [phone]

## 2025-03-13 ENCOUNTER — OFFICE VISIT (OUTPATIENT)
Dept: CARDIOLOGY CLINIC | Age: 60
End: 2025-03-13
Payer: COMMERCIAL

## 2025-03-13 VITALS
HEART RATE: 60 BPM | SYSTOLIC BLOOD PRESSURE: 140 MMHG | HEIGHT: 74 IN | DIASTOLIC BLOOD PRESSURE: 82 MMHG | WEIGHT: 315 LBS | BODY MASS INDEX: 40.43 KG/M2

## 2025-03-13 DIAGNOSIS — I48.91 ATRIAL FIBRILLATION, UNSPECIFIED TYPE (HCC): Primary | ICD-10-CM

## 2025-03-13 DIAGNOSIS — I10 HYPERTENSION, UNSPECIFIED TYPE: ICD-10-CM

## 2025-03-13 PROCEDURE — 3077F SYST BP >= 140 MM HG: CPT | Performed by: INTERNAL MEDICINE

## 2025-03-13 PROCEDURE — 3079F DIAST BP 80-89 MM HG: CPT | Performed by: INTERNAL MEDICINE

## 2025-03-13 PROCEDURE — 99214 OFFICE O/P EST MOD 30 MIN: CPT | Performed by: INTERNAL MEDICINE

## 2025-03-13 PROCEDURE — 93000 ELECTROCARDIOGRAM COMPLETE: CPT | Performed by: INTERNAL MEDICINE

## 2025-03-13 RX ORDER — ASPIRIN 81 MG/1
81 TABLET ORAL DAILY
COMMUNITY

## 2025-03-13 NOTE — PROGRESS NOTES
[facility] [place] [facility]  [facility] CARDIOLOGY  [address]  [address]  [facility] [address]  Dept: [phone]  Dept Fax: [phone]  Loc: [phone]    Visit Date: 3/13/2025  Mr. Paulson is a 59 y.o. male who presented for:  HTN  A fib   CAD  HPI:   Carl Paulson is a pleasant 59 y.o. male who  has a past medical history of Asthma, Atrial fibrillation (HCC), GERD (gastroesophageal reflux disease), Hyperlipidemia, Hypertension, Obesity, and Sleep apnea. Echocardiogram in 2017 revealed a preserved EF. LHC in 2017 revealed nonobstructive CAD. He has h/o paroxysmal atrial fibrillation, not on OAC, chadsvasc score 1, no know recent recurrence. Denies h/o TIA or CVA. The patient presents for follow up. Patient denies chest pain, shortness of breath, dyspnea on exertion. Had palpitations while visiting [place] in 11/2024, resolved. No recurrence. Denies dizziness, syncope.      Current Outpatient Medications:     meloxicam (MOBIC) 15 MG tablet, Take 1 tablet by mouth daily, Disp: , Rfl:     metoprolol succinate (TOPROL XL) 50 MG extended release tablet, Take 1 tablet by mouth daily, Disp: 90 tablet, Rfl: 3    spironolactone (ALDACTONE) 25 MG tablet, Take 2 tablets by mouth daily, Disp: 180 tablet, Rfl: 3    losartan (COZAAR) 50 MG tablet, Take 1 tablet by mouth daily, Disp: 90 tablet, Rfl: 3    aspirin 81 MG EC tablet, Take 1 tablet by mouth daily, Disp: 90 tablet, Rfl: 0    Past Medical History  Carl  has a past medical history of Asthma, Atrial fibrillation (HCC), GERD (gastroesophageal reflux disease), Hyperlipidemia, Hypertension, Obesity, and Sleep apnea.    Social History  Carl  reports that he has never smoked. He has never used smokeless tobacco. He reports current alcohol use. He reports that he does not use drugs.    Family History  Carl's family history is not on file.    Past Surgical History   Carl  has a past surgical history that includes Colonoscopy (09/20/2016) and  Outpatient Medications Marked as Taking for the 1/30/20 encounter (Refill) with Shivam Garduno, DO   Medication Sig Dispense Refill   • levothyroxine (SYNTHROID, LEVOTHROID) 125 MCG tablet Take 1 tablet by mouth daily. 90 tablet 0     Last refill 11/11/19   Last Visit: 5/22/19   Next Visit: 4/10/2020    Labs:   TSH (mcUnits/mL)   Date Value   12/27/2019 0.365       Refilled per [facility].

## 2025-03-13 NOTE — PROGRESS NOTES
1 year follow up.    EKG done today.    Denies chest pain, palpitations, dizziness, shortness of breath, and edema.     No cardiac concerns at this time.

## 2025-03-31 RX ORDER — LOSARTAN POTASSIUM 50 MG/1
50 TABLET ORAL DAILY
Qty: 90 TABLET | Refills: 3 | Status: SHIPPED | OUTPATIENT
Start: 2025-03-31

## 2025-03-31 RX ORDER — METOPROLOL SUCCINATE 50 MG/1
50 TABLET, EXTENDED RELEASE ORAL DAILY
Qty: 90 TABLET | Refills: 3 | Status: SHIPPED | OUTPATIENT
Start: 2025-03-31

## 2025-04-07 ENCOUNTER — TELEPHONE (OUTPATIENT)
Dept: CARDIOLOGY CLINIC | Age: 60
End: 2025-04-07

## 2025-04-07 DIAGNOSIS — I48.91 ATRIAL FIBRILLATION, UNSPECIFIED TYPE (HCC): Primary | ICD-10-CM

## 2025-04-07 DIAGNOSIS — I48.0 PAROXYSMAL ATRIAL FIBRILLATION (HCC): ICD-10-CM

## 2025-04-07 DIAGNOSIS — I48.92 ATRIAL FLUTTER, UNSPECIFIED TYPE (HCC): ICD-10-CM

## 2025-04-07 NOTE — TELEPHONE ENCOUNTER
Pt to report he's been in A.Fib x 1 week  Apple watch irregular HR 60s bpm  Not on OAC   Complaining of shortness of breath with exertion  Fatigue    Advise?

## 2025-04-07 NOTE — TELEPHONE ENCOUNTER
Noted  Has h/o paroxysmal atrial fibrillation. Chadsvasc score 1. On ASA 81 mg po daily   Increase Toprol XL to 75 mg po daily  Order a 48 holter monitor   Order a full echocardiogram  Based on holter findings, response to above treatment and result from Echocardiogram (if no structural heart disease is found), then adding Flecainide Vs EP referral can be considered  Avoid caffeine   Give ER warning signs

## 2025-04-08 NOTE — TELEPHONE ENCOUNTER
Spoke to patient.  Notified.    Okay to order testing.  48 hr monitor and ECHO.  Metoprolol pended.   ER warning signs given.

## 2025-04-08 NOTE — TELEPHONE ENCOUNTER
Spoke with patient to schedule echo and holter for first available consecutive appointments, 4/18/25 with arrival time of 12:45 pm. Patient verbalized understanding of arrival time and location.

## 2025-04-18 ENCOUNTER — HOSPITAL ENCOUNTER (OUTPATIENT)
Age: 60
Discharge: HOME | End: 2025-04-20
Attending: INTERNAL MEDICINE
Payer: COMMERCIAL

## 2025-04-18 VITALS
WEIGHT: 315 LBS | SYSTOLIC BLOOD PRESSURE: 140 MMHG | DIASTOLIC BLOOD PRESSURE: 82 MMHG | BODY MASS INDEX: 40.43 KG/M2 | HEIGHT: 74 IN

## 2025-04-18 DIAGNOSIS — I48.91 ATRIAL FIBRILLATION, UNSPECIFIED TYPE (HCC): ICD-10-CM

## 2025-04-18 DIAGNOSIS — I48.0 PAROXYSMAL ATRIAL FIBRILLATION (HCC): ICD-10-CM

## 2025-04-18 DIAGNOSIS — I48.92 ATRIAL FLUTTER, UNSPECIFIED TYPE (HCC): ICD-10-CM

## 2025-04-18 LAB
ECHO AO ASC DIAM: 3.4 CM
ECHO AO ASCENDING AORTA INDEX: 1.28 CM/M2
ECHO AV CUSP MM: 2.3 CM
ECHO AV PEAK GRADIENT: 17 MMHG
ECHO AV PEAK VELOCITY: 2 M/S
ECHO AV VELOCITY RATIO: 0.65
ECHO BSA: 2.76 M2
ECHO BSA: 2.76 M2
ECHO EST RA PRESSURE: 5 MMHG
ECHO LA AREA 2C: 18.2 CM2
ECHO LA AREA 4C: 20.1 CM2
ECHO LA DIAMETER INDEX: 1.73 CM/M2
ECHO LA DIAMETER: 4.6 CM
ECHO LA MAJOR AXIS: 5.9 CM
ECHO LA MINOR AXIS: 5.5 CM
ECHO LA VOL BP: 53 ML (ref 18–58)
ECHO LA VOL MOD A2C: 49 ML (ref 18–58)
ECHO LA VOL MOD A4C: 53 ML (ref 18–58)
ECHO LA VOL/BSA BIPLANE: 20 ML/M2 (ref 16–34)
ECHO LA VOLUME INDEX MOD A2C: 18 ML/M2 (ref 16–34)
ECHO LA VOLUME INDEX MOD A4C: 20 ML/M2 (ref 16–34)
ECHO LV EDV A2C: 69 ML
ECHO LV EDV A4C: 70 ML
ECHO LV EDV INDEX A4C: 26 ML/M2
ECHO LV EDV NDEX A2C: 26 ML/M2
ECHO LV EF PHYSICIAN: 55 %
ECHO LV EJECTION FRACTION A2C: 55 %
ECHO LV EJECTION FRACTION A4C: 56 %
ECHO LV EJECTION FRACTION BIPLANE: 55 % (ref 55–100)
ECHO LV ESV A2C: 31 ML
ECHO LV ESV A4C: 31 ML
ECHO LV ESV INDEX A2C: 12 ML/M2
ECHO LV ESV INDEX A4C: 12 ML/M2
ECHO LV ISOVOLUMETRIC RELAXATION TIME (IVRT): 70 MS
ECHO LV IVSD: 1 CM (ref 0.6–1)
ECHO LVOT PEAK GRADIENT: 6 MMHG
ECHO LVOT PEAK VELOCITY: 1.3 M/S
ECHO MV E DECELERATION TIME (DT): 162 MS
ECHO MV E VELOCITY: 1.05 M/S
ECHO PV MAX VELOCITY: 0.9 M/S
ECHO PV PEAK GRADIENT: 3 MMHG
ECHO RIGHT VENTRICULAR SYSTOLIC PRESSURE (RVSP): 20 MMHG
ECHO RV INTERNAL DIMENSION: 2.5 CM
ECHO RV TAPSE: 2.5 CM (ref 1.7–?)
ECHO TV E WAVE: 0.7 M/S
ECHO TV REGURGITANT MAX VELOCITY: 1.91 M/S
ECHO TV REGURGITANT PEAK GRADIENT: 15 MMHG

## 2025-04-18 PROCEDURE — 93225 XTRNL ECG REC<48 HRS REC: CPT

## 2025-04-18 PROCEDURE — 93306 TTE W/DOPPLER COMPLETE: CPT

## 2025-04-18 PROCEDURE — 93306 TTE W/DOPPLER COMPLETE: CPT | Performed by: INTERNAL MEDICINE

## 2025-04-28 NOTE — PROGRESS NOTES
[facility] [place] [facility]  [facility] CARDIOLOGY  [address]  [address]  [facility] [address]  Dept: [phone]  Dept Fax: [phone]  Loc: [phone]    Visit Date: 4/29/2025  Mr. Paulson is a 59 y.o. male who presented for:  \"In atrial fibrillation for one month\"  HPI:   Carl Paulson is a pleasant 59 y.o. male who  has a past medical history of Asthma, Atrial fibrillation (HCC), GERD (gastroesophageal reflux disease), Hyperlipidemia, Hypertension, Obesity, and Sleep apnea. LHC in 2017 revealed nonobstructive CAD. He has h/o paroxysmal atrial fibrillation, not on OAC, chadsvasc score 1, no know recent recurrence. Earlier this month, patient called and reported shortness of breath and dyspnea on exertion, was concerned that he has been \"in A Fib\". Toprol XL was increased to 75 mg po daily.  Echocardiogram in 4/2025 revealed a preserved EF. Holter monitor 4/2025 revealed normal sinus rhythm, no atrial fibrillation detected. The patient reports intermittent chest pains, retrosternal, mild, 1-2/10 most of times but was up to 5/10 on one occasion and was associated with excessive sweating. He thinks he has recurrence of atrial fibrillation, reports palpitations. He states that his smart watch has alarmed him that he had A Fib episodes. Patient reports worsening dyspnea on exertion and fatigue. No dizziness, syncope.       Current Outpatient Medications:     metoprolol succinate (TOPROL XL) 50 MG extended release tablet, TAKE 1 TABLET BY MOUTH EVERY DAY, Disp: 90 tablet, Rfl: 3    losartan (COZAAR) 50 MG tablet, TAKE 1 TABLET BY MOUTH EVERY DAY, Disp: 90 tablet, Rfl: 3    aspirin 81 MG EC tablet, Take 1 tablet by mouth daily, Disp: , Rfl:     meloxicam (MOBIC) 15 MG tablet, Take 1 tablet by mouth daily, Disp: , Rfl:     spironolactone (ALDACTONE) 25 MG tablet, Take 2 tablets by mouth daily, Disp: 180 tablet, Rfl: 3    Past Medical History  Carl  has a past medical history of Asthma,

## 2025-04-29 ENCOUNTER — HOSPITAL ENCOUNTER (OUTPATIENT)
Age: 60
Discharge: HOME | End: 2025-04-29
Payer: COMMERCIAL

## 2025-04-29 ENCOUNTER — OFFICE VISIT (OUTPATIENT)
Dept: CARDIOLOGY CLINIC | Age: 60
End: 2025-04-29
Payer: COMMERCIAL

## 2025-04-29 VITALS
WEIGHT: 315 LBS | DIASTOLIC BLOOD PRESSURE: 78 MMHG | SYSTOLIC BLOOD PRESSURE: 122 MMHG | BODY MASS INDEX: 40.43 KG/M2 | HEART RATE: 58 BPM | HEIGHT: 74 IN

## 2025-04-29 DIAGNOSIS — I20.9 ANGINA PECTORIS: ICD-10-CM

## 2025-04-29 DIAGNOSIS — I48.91 ATRIAL FIBRILLATION, UNSPECIFIED TYPE (HCC): Primary | ICD-10-CM

## 2025-04-29 DIAGNOSIS — I48.91 ATRIAL FIBRILLATION, UNSPECIFIED TYPE (HCC): ICD-10-CM

## 2025-04-29 LAB
ACQUISITION DURATION: NORMAL S
AVERAGE HEART RATE: 82 BPM
ECHO BSA: 2.76 M2
HOOKUP DATE: NORMAL
HOOKUP TIME: NORMAL
MAX HEART RATE TIME/DATE: NORMAL
MAX HEART RATE: 156 BPM
MIN HEART RATE TIME/DATE: NORMAL
MIN HEART RATE: 43 BPM
NUMBER OF QRS COMPLEXES: NORMAL
NUMBER OF SUPRAVENTRICULAR COUPLETS: 0
NUMBER OF SUPRAVENTRICULAR ECTOPICS: 0
NUMBER OF SUPRAVENTRICULAR ISOLATED BEATS: 0
NUMBER OF VENTRICULAR BIGEMINAL CYCLES: 0
NUMBER OF VENTRICULAR COUPLETS: 0
NUMBER OF VENTRICULAR ECTOPICS: 45
TSH SERPL DL<=0.05 MIU/L-ACNC: 2.24 UIU/ML (ref 0.27–4.2)

## 2025-04-29 PROCEDURE — 3078F DIAST BP <80 MM HG: CPT | Performed by: INTERNAL MEDICINE

## 2025-04-29 PROCEDURE — 36415 COLL VENOUS BLD VENIPUNCTURE: CPT

## 2025-04-29 PROCEDURE — 99214 OFFICE O/P EST MOD 30 MIN: CPT | Performed by: INTERNAL MEDICINE

## 2025-04-29 PROCEDURE — 84443 ASSAY THYROID STIM HORMONE: CPT

## 2025-04-29 PROCEDURE — 93000 ELECTROCARDIOGRAM COMPLETE: CPT | Performed by: INTERNAL MEDICINE

## 2025-04-29 PROCEDURE — 3074F SYST BP LT 130 MM HG: CPT | Performed by: INTERNAL MEDICINE

## 2025-04-29 RX ORDER — AMIODARONE HYDROCHLORIDE 200 MG/1
200 TABLET ORAL DAILY
Qty: 90 TABLET | Refills: 2 | Status: SHIPPED | OUTPATIENT
Start: 2025-04-29

## 2025-04-29 NOTE — PROGRESS NOTES
Patient here for a follow up for A-fib   Reports he has been in it since March 259th  Reports HILL  Taking Metoprolol 75mg QD now.     EKG completed today to check rhythm/rate

## 2025-04-30 ENCOUNTER — HOSPITAL ENCOUNTER (OUTPATIENT)
Dept: NUCLEAR MEDICINE | Age: 60
Discharge: HOME | End: 2025-04-30
Attending: INTERNAL MEDICINE
Payer: COMMERCIAL

## 2025-04-30 ENCOUNTER — RESULTS FOLLOW-UP (OUTPATIENT)
Dept: CARDIOLOGY CLINIC | Age: 60
End: 2025-04-30

## 2025-04-30 ENCOUNTER — HOSPITAL ENCOUNTER (OUTPATIENT)
Age: 60
Discharge: HOME | End: 2025-05-02
Attending: INTERNAL MEDICINE
Payer: COMMERCIAL

## 2025-04-30 VITALS — BODY MASS INDEX: 40.43 KG/M2 | HEIGHT: 74 IN | WEIGHT: 315 LBS

## 2025-04-30 DIAGNOSIS — I20.9 ANGINA PECTORIS: ICD-10-CM

## 2025-04-30 LAB
ECHO BSA: 2.74 M2
NUC STRESS EJECTION FRACTION: 57 %
STRESS BASELINE DIAS BP: 94 MMHG
STRESS BASELINE HR: 50 BPM
STRESS BASELINE SYS BP: 141 MMHG
STRESS ESTIMATED WORKLOAD: 1 METS
STRESS PEAK DIAS BP: 94 MMHG
STRESS PEAK SYS BP: 141 MMHG
STRESS PERCENT HR ACHIEVED: 51 %
STRESS POST PEAK HR: 83 BPM
STRESS RATE PRESSURE PRODUCT: NORMAL BPM*MMHG
STRESS STAGE 1 BP: NORMAL MMHG
STRESS STAGE 1 DURATION: 1 MIN:SEC
STRESS STAGE 1 HR: 53 BPM
STRESS STAGE 2 BP: NORMAL MMHG
STRESS STAGE 2 DURATION: 1 MIN:SEC
STRESS STAGE 2 HR: 77 BPM
STRESS STAGE 3 BP: NORMAL MMHG
STRESS STAGE 3 DURATION: 1 MIN:SEC
STRESS STAGE 3 HR: 73 BPM
STRESS STAGE RECOVERY 1 BP: NORMAL MMHG
STRESS STAGE RECOVERY 1 DURATION: 1 MIN:SEC
STRESS STAGE RECOVERY 1 HR: 65 BPM
STRESS STAGE RECOVERY 2 BP: NORMAL MMHG
STRESS STAGE RECOVERY 2 DURATION: 1 MIN:SEC
STRESS STAGE RECOVERY 2 HR: 67 BPM
STRESS STAGE RECOVERY 3 BP: NORMAL MMHG
STRESS STAGE RECOVERY 3 DURATION: 1 MIN:SEC
STRESS STAGE RECOVERY 3 HR: 70 BPM
STRESS STAGE RECOVERY 4 BP: NORMAL MMHG
STRESS STAGE RECOVERY 4 DURATION: 2 MIN:SEC
STRESS STAGE RECOVERY 4 HR: 61 BPM
STRESS TARGET HR: 162 BPM
TID: 1.18

## 2025-04-30 PROCEDURE — 3430000000 HC RX DIAGNOSTIC RADIOPHARMACEUTICAL: Performed by: INTERNAL MEDICINE

## 2025-04-30 PROCEDURE — 93018 CV STRESS TEST I&R ONLY: CPT | Performed by: INTERNAL MEDICINE

## 2025-04-30 PROCEDURE — 93016 CV STRESS TEST SUPVJ ONLY: CPT | Performed by: INTERNAL MEDICINE

## 2025-04-30 PROCEDURE — 93017 CV STRESS TEST TRACING ONLY: CPT

## 2025-04-30 PROCEDURE — 78452 HT MUSCLE IMAGE SPECT MULT: CPT | Performed by: INTERNAL MEDICINE

## 2025-04-30 PROCEDURE — 6360000002 HC RX W HCPCS: Performed by: INTERNAL MEDICINE

## 2025-04-30 PROCEDURE — A9500 TC99M SESTAMIBI: HCPCS | Performed by: INTERNAL MEDICINE

## 2025-04-30 PROCEDURE — 78452 HT MUSCLE IMAGE SPECT MULT: CPT

## 2025-04-30 RX ORDER — REGADENOSON 0.08 MG/ML
0.4 INJECTION, SOLUTION INTRAVENOUS
Status: COMPLETED | OUTPATIENT
Start: 2025-04-30 | End: 2025-04-30

## 2025-04-30 RX ORDER — TETRAKIS(2-METHOXYISOBUTYLISOCYANIDE)COPPER(I) TETRAFLUOROBORATE 1 MG/ML
10.7 INJECTION, POWDER, LYOPHILIZED, FOR SOLUTION INTRAVENOUS
Status: COMPLETED | OUTPATIENT
Start: 2025-04-30 | End: 2025-04-30

## 2025-04-30 RX ORDER — TETRAKIS(2-METHOXYISOBUTYLISOCYANIDE)COPPER(I) TETRAFLUOROBORATE 1 MG/ML
34.2 INJECTION, POWDER, LYOPHILIZED, FOR SOLUTION INTRAVENOUS
Status: COMPLETED | OUTPATIENT
Start: 2025-04-30 | End: 2025-04-30

## 2025-04-30 RX ADMIN — Medication 10.7 MILLICURIE: at 07:36

## 2025-04-30 RX ADMIN — Medication 34.2 MILLICURIE: at 08:28

## 2025-04-30 RX ADMIN — REGADENOSON 0.4 MG: 0.08 INJECTION, SOLUTION INTRAVENOUS at 08:26

## 2025-05-01 NOTE — TELEPHONE ENCOUNTER
Ed Hugo MD  P [facility] Clinical Staff  NORMAL TSH  Stress test was negative  Inform patient  Start Amiodarone 200 mg po daily and proceed with GABRIELLA/DCCV as planned   04/29/2025 - Results: Taz, [facility] Incoming Lab Results From Soft and Ed Hugo MD  (Newest Message First)            Ed Hugo MD to [facility] Clinical Staff (Selected Message)      4/30/25  4:44 PM  Result Note  NORMAL TSH Stress test was negative Inform patient Start Amiodarone 200 mg po daily and proceed with GABRIELLA/DCCV as planned  TSH reflex to FT4

## 2025-05-01 NOTE — TELEPHONE ENCOUNTER
----- Message from Dr. Ed Hugo MD sent at 4/30/2025  4:44 PM EDT -----  NORMAL TSH  Stress test was negative  Inform patient  Start Amiodarone 200 mg po daily and proceed with GABRIELLA/DCCV as planned

## 2025-05-01 NOTE — TELEPHONE ENCOUNTER
Spoke with patient, he said he did already start the amiodarone as he thought he was supposed to and pt will be here for procedure as planned.

## 2025-05-02 ENCOUNTER — TELEPHONE (OUTPATIENT)
Dept: CARDIOLOGY CLINIC | Age: 60
End: 2025-05-02

## 2025-05-02 NOTE — TELEPHONE ENCOUNTER
Spoke to patient.  Letter generated and out for Dr Hugo to sign. Pt states he can print the letter from My Chart.

## 2025-05-02 NOTE — TELEPHONE ENCOUNTER
an E Lorene to [facility] (supporting Ed Hugo MD) (Selected Message)  MARY      5/2/25 11:13 AM  Could you please provide a letter or note letting my employer know of limitations you need me to follow, if any. I have currently been limiting myself to hard physical labor. I have been doing lighter duties to keep my heart from beating rapidly and myself becoming short of breath.      Thanks,  Carl

## 2025-05-02 NOTE — TELEPHONE ENCOUNTER
Chart reviewed  Stress test was negative  AF   GABRIELLA/DCCV is planned  Ok to provide letter to ask for limited duties at work  Also, if patient wishes and thinks he can not do light duties, may stay off till DCCV is done

## 2025-05-07 ENCOUNTER — PREP FOR PROCEDURE (OUTPATIENT)
Dept: CARDIOLOGY | Age: 60
End: 2025-05-07

## 2025-05-07 RX ORDER — SODIUM CHLORIDE 0.9 % (FLUSH) 0.9 %
5-40 SYRINGE (ML) INJECTION PRN
Status: CANCELLED | OUTPATIENT
Start: 2025-05-07

## 2025-05-07 RX ORDER — SODIUM CHLORIDE 9 MG/ML
INJECTION, SOLUTION INTRAVENOUS PRN
Status: CANCELLED | OUTPATIENT
Start: 2025-05-07

## 2025-05-07 RX ORDER — SODIUM CHLORIDE 9 MG/ML
INJECTION, SOLUTION INTRAVENOUS CONTINUOUS
Status: CANCELLED | OUTPATIENT
Start: 2025-05-07

## 2025-05-07 RX ORDER — SODIUM CHLORIDE 0.9 % (FLUSH) 0.9 %
5-40 SYRINGE (ML) INJECTION EVERY 12 HOURS SCHEDULED
Status: CANCELLED | OUTPATIENT
Start: 2025-05-07

## 2025-05-08 ENCOUNTER — HOSPITAL ENCOUNTER (OUTPATIENT)
Age: 60
Discharge: HOME | End: 2025-05-10
Attending: INTERNAL MEDICINE
Payer: COMMERCIAL

## 2025-05-08 VITALS
SYSTOLIC BLOOD PRESSURE: 122 MMHG | HEIGHT: 74 IN | HEART RATE: 57 BPM | DIASTOLIC BLOOD PRESSURE: 70 MMHG | OXYGEN SATURATION: 95 % | WEIGHT: 310.85 LBS | BODY MASS INDEX: 39.89 KG/M2 | TEMPERATURE: 97.4 F | RESPIRATION RATE: 13 BRPM

## 2025-05-08 DIAGNOSIS — I20.9 ANGINA PECTORIS: ICD-10-CM

## 2025-05-08 DIAGNOSIS — I48.91 ATRIAL FIBRILLATION, UNSPECIFIED TYPE (HCC): ICD-10-CM

## 2025-05-08 LAB
ABO GROUP BLD: NORMAL
ANION GAP SERPL CALC-SCNC: 10 MEQ/L (ref 8–16)
APTT PPP: 29 SECONDS (ref 22–38)
B-HCG SERPL QL: NEGATIVE
BUN SERPL-MCNC: 21 MG/DL (ref 8–23)
CALCIUM SERPL-MCNC: 9 MG/DL (ref 8.8–10.2)
CHLORIDE SERPL-SCNC: 106 MEQ/L (ref 98–111)
CHOLEST SERPL-MCNC: 144 MG/DL (ref 100–199)
CO2 SERPL-SCNC: 24 MEQ/L (ref 22–29)
CREAT SERPL-MCNC: 1 MG/DL (ref 0.7–1.2)
DEPRECATED RDW RBC AUTO: 45.1 FL (ref 35–45)
ECHO BSA: 2.71 M2
ECHO BSA: 2.71 M2
ECHO EST RA PRESSURE: 3 MMHG
ECHO LV EF PHYSICIAN: 60 %
EKG ATRIAL RATE: 52 BPM
EKG P AXIS: 42 DEGREES
EKG P-R INTERVAL: 214 MS
EKG Q-T INTERVAL: 402 MS
EKG Q-T INTERVAL: 438 MS
EKG QRS DURATION: 82 MS
EKG QRS DURATION: 84 MS
EKG QTC CALCULATION (BAZETT): 402 MS
EKG QTC CALCULATION (BAZETT): 407 MS
EKG R AXIS: 62 DEGREES
EKG R AXIS: 78 DEGREES
EKG T AXIS: 29 DEGREES
EKG T AXIS: 52 DEGREES
EKG VENTRICULAR RATE: 52 BPM
EKG VENTRICULAR RATE: 60 BPM
ERYTHROCYTE [DISTWIDTH] IN BLOOD BY AUTOMATED COUNT: 13.4 % (ref 11.5–14.5)
GFR SERPL CREATININE-BSD FRML MDRD: 87 ML/MIN/1.73M2
GLUCOSE SERPL-MCNC: 96 MG/DL (ref 74–109)
HCT VFR BLD AUTO: 41.1 % (ref 42–52)
HDLC SERPL-MCNC: 32 MG/DL
HGB BLD-MCNC: 13.7 GM/DL (ref 14–18)
IAT IGG-SP REAG SERPL QL: NORMAL
INR PPP: 1.06 (ref 0.85–1.13)
LDLC SERPL CALC-MCNC: 93 MG/DL
MCH RBC QN AUTO: 30.4 PG (ref 26–33)
MCHC RBC AUTO-ENTMCNC: 33.3 GM/DL (ref 32.2–35.5)
MCV RBC AUTO: 91.3 FL (ref 80–94)
PLATELET # BLD AUTO: 254 THOU/MM3 (ref 130–400)
PMV BLD AUTO: 9.8 FL (ref 9.4–12.4)
POTASSIUM SERPL-SCNC: 4.7 MEQ/L (ref 3.5–5.2)
RBC # BLD AUTO: 4.5 MILL/MM3 (ref 4.7–6.1)
RH BLD: NORMAL
SODIUM SERPL-SCNC: 140 MEQ/L (ref 135–145)
TRIGL SERPL-MCNC: 94 MG/DL (ref 0–199)
WBC # BLD AUTO: 6.9 THOU/MM3 (ref 4.8–10.8)

## 2025-05-08 PROCEDURE — 7100000010 HC PHASE II RECOVERY - FIRST 15 MIN: Performed by: INTERNAL MEDICINE

## 2025-05-08 PROCEDURE — 99152 MOD SED SAME PHYS/QHP 5/>YRS: CPT | Performed by: INTERNAL MEDICINE

## 2025-05-08 PROCEDURE — 92960 CARDIOVERSION ELECTRIC EXT: CPT

## 2025-05-08 PROCEDURE — 6360000002 HC RX W HCPCS: Performed by: INTERNAL MEDICINE

## 2025-05-08 PROCEDURE — 93312 ECHO TRANSESOPHAGEAL: CPT | Performed by: INTERNAL MEDICINE

## 2025-05-08 PROCEDURE — 7100000011 HC PHASE II RECOVERY - ADDTL 15 MIN: Performed by: INTERNAL MEDICINE

## 2025-05-08 PROCEDURE — 2580000003 HC RX 258: Performed by: PHYSICIAN ASSISTANT

## 2025-05-08 PROCEDURE — 93005 ELECTROCARDIOGRAM TRACING: CPT | Performed by: PHYSICIAN ASSISTANT

## 2025-05-08 PROCEDURE — 86885 COOMBS TEST INDIRECT QUAL: CPT

## 2025-05-08 PROCEDURE — 86901 BLOOD TYPING SEROLOGIC RH(D): CPT

## 2025-05-08 PROCEDURE — 93312 ECHO TRANSESOPHAGEAL: CPT

## 2025-05-08 PROCEDURE — 86900 BLOOD TYPING SEROLOGIC ABO: CPT

## 2025-05-08 PROCEDURE — 6370000000 HC RX 637 (ALT 250 FOR IP): Performed by: INTERNAL MEDICINE

## 2025-05-08 PROCEDURE — 2500000003 HC RX 250 WO HCPCS: Performed by: INTERNAL MEDICINE

## 2025-05-08 PROCEDURE — 93010 ELECTROCARDIOGRAM REPORT: CPT | Performed by: INTERNAL MEDICINE

## 2025-05-08 PROCEDURE — 85027 COMPLETE CBC AUTOMATED: CPT

## 2025-05-08 PROCEDURE — 80061 LIPID PANEL: CPT

## 2025-05-08 PROCEDURE — 92960 CARDIOVERSION ELECTRIC EXT: CPT | Performed by: INTERNAL MEDICINE

## 2025-05-08 PROCEDURE — 36415 COLL VENOUS BLD VENIPUNCTURE: CPT

## 2025-05-08 PROCEDURE — 84703 CHORIONIC GONADOTROPIN ASSAY: CPT

## 2025-05-08 PROCEDURE — 80048 BASIC METABOLIC PNL TOTAL CA: CPT

## 2025-05-08 PROCEDURE — 93005 ELECTROCARDIOGRAM TRACING: CPT | Performed by: INTERNAL MEDICINE

## 2025-05-08 PROCEDURE — 93325 DOPPLER ECHO COLOR FLOW MAPG: CPT | Performed by: INTERNAL MEDICINE

## 2025-05-08 PROCEDURE — 85730 THROMBOPLASTIN TIME PARTIAL: CPT

## 2025-05-08 PROCEDURE — 85610 PROTHROMBIN TIME: CPT

## 2025-05-08 PROCEDURE — 93320 DOPPLER ECHO COMPLETE: CPT | Performed by: INTERNAL MEDICINE

## 2025-05-08 RX ORDER — SODIUM CHLORIDE 0.9 % (FLUSH) 0.9 %
5-40 SYRINGE (ML) INJECTION PRN
Status: DISCONTINUED | OUTPATIENT
Start: 2025-05-08 | End: 2025-05-11 | Stop reason: HOSPADM

## 2025-05-08 RX ORDER — FLUMAZENIL 0.1 MG/ML
INJECTION INTRAVENOUS PRN
Status: COMPLETED | OUTPATIENT
Start: 2025-05-08 | End: 2025-05-08

## 2025-05-08 RX ORDER — SODIUM CHLORIDE 9 MG/ML
INJECTION, SOLUTION INTRAVENOUS PRN
Status: DISCONTINUED | OUTPATIENT
Start: 2025-05-08 | End: 2025-05-11 | Stop reason: HOSPADM

## 2025-05-08 RX ORDER — SODIUM CHLORIDE 0.9 % (FLUSH) 0.9 %
5-40 SYRINGE (ML) INJECTION EVERY 12 HOURS SCHEDULED
Status: DISCONTINUED | OUTPATIENT
Start: 2025-05-08 | End: 2025-05-11 | Stop reason: HOSPADM

## 2025-05-08 RX ORDER — SODIUM CHLORIDE 9 MG/ML
INJECTION, SOLUTION INTRAVENOUS CONTINUOUS
Status: DISCONTINUED | OUTPATIENT
Start: 2025-05-08 | End: 2025-05-11 | Stop reason: HOSPADM

## 2025-05-08 RX ORDER — METOPROLOL SUCCINATE 50 MG/1
75 TABLET, EXTENDED RELEASE ORAL DAILY
COMMUNITY

## 2025-05-08 RX ORDER — MIDAZOLAM HYDROCHLORIDE 1 MG/ML
INJECTION, SOLUTION INTRAMUSCULAR; INTRAVENOUS PRN
Status: COMPLETED | OUTPATIENT
Start: 2025-05-08 | End: 2025-05-08

## 2025-05-08 RX ORDER — NALOXONE HYDROCHLORIDE 0.4 MG/ML
INJECTION, SOLUTION INTRAMUSCULAR; INTRAVENOUS; SUBCUTANEOUS PRN
Status: COMPLETED | OUTPATIENT
Start: 2025-05-08 | End: 2025-05-08

## 2025-05-08 RX ORDER — FENTANYL CITRATE 50 UG/ML
INJECTION, SOLUTION INTRAMUSCULAR; INTRAVENOUS PRN
Status: COMPLETED | OUTPATIENT
Start: 2025-05-08 | End: 2025-05-08

## 2025-05-08 RX ADMIN — FENTANYL CITRATE 25 MCG: 50 INJECTION, SOLUTION INTRAMUSCULAR; INTRAVENOUS at 11:26

## 2025-05-08 RX ADMIN — MIDAZOLAM 1 MG: 1 INJECTION INTRAMUSCULAR; INTRAVENOUS at 11:26

## 2025-05-08 RX ADMIN — MIDAZOLAM 1 MG: 1 INJECTION INTRAMUSCULAR; INTRAVENOUS at 11:28

## 2025-05-08 RX ADMIN — FLUMAZENIL 0.4 MG: 0.1 INJECTION INTRAVENOUS at 11:32

## 2025-05-08 RX ADMIN — MIDAZOLAM 2 MG: 1 INJECTION INTRAMUSCULAR; INTRAVENOUS at 11:22

## 2025-05-08 RX ADMIN — SODIUM CHLORIDE: 0.9 INJECTION, SOLUTION INTRAVENOUS at 10:33

## 2025-05-08 RX ADMIN — APIXABAN 5 MG: 5 TABLET, FILM COATED ORAL at 12:39

## 2025-05-08 RX ADMIN — FENTANYL CITRATE 25 MCG: 50 INJECTION, SOLUTION INTRAMUSCULAR; INTRAVENOUS at 11:28

## 2025-05-08 RX ADMIN — NALOXONE HYDROCHLORIDE 0.4 MG: 0.4 INJECTION, SOLUTION INTRAMUSCULAR; INTRAVENOUS; SUBCUTANEOUS at 11:32

## 2025-05-08 RX ADMIN — FENTANYL CITRATE 50 MCG: 50 INJECTION, SOLUTION INTRAMUSCULAR; INTRAVENOUS at 11:22

## 2025-05-08 NOTE — H&P
[facility]  Sedation/Analgesia History & Physical    Pt Name: Carl Paulson  Account number: [id number]  MRN: [id number]  YOB: 1965  Provider Performing Procedure: Ed Hugo MD MD  Referring Provider: Ed Hugo MD   Primary Care Physician: Vipin Schrader MD  Date: 5/8/2025    PRE-PROCEDURE    Code Status: FULL CODE  Brief History/Pre-Procedure Diagnosis:   Symptomatic atrial fibrillation, uncontrolled with medications    Consent: : I have discussed with the patient risks, benefits, and alternatives (and relevant risks, benefits, and side effects related to alternatives or not receiving care), and likelihood of the success.   The patient and/or representative appear to understand and agree to proceed.  The discussion encompasses risks, benefits, and side effects related to the alternatives and the risks related to not receiving the proposed care, treatment, and services.     The indication, risks and benefits of the procedure and possible therapeutic consequences and alternatives were discussed with the patient. The patient was given the opportunity to ask questions and to have them answered to his/her satisfaction. Risks of the procedure include but are not limited to the following: Bleeding, hematoma including retroperitoneal hemmorhage, infection, pain and discomfort, injury to the aorta and other blood vessels, rhythm disturbance, low blood pressure, myocardial infarction, stroke, kidney damage/failure, myocardial perforation, allergic reactions to sedatives/contrast material, loss of pulse/vascular compromise requiring surgery, aneurysm/pseudoaneurysm formation, possible loss of a limb/hand/leg, needing blood transfusion, requiring emergent open heart surgery or emergent coronary intervention, the need for intubation/respiratory support, the requirement for defibrillation/cardioversion, and death. Alternatives to and omission of the suggested procedure were

## 2025-05-08 NOTE — PROGRESS NOTES
0900  Pt admitted to  2E03 ambulatory for GABRIELLA / cardioversion.  Pt NPO. Patient accompanied by spouse.   Vital signs obtained.  Assessment and data collection initiated.   Oriented to room.   Policies and procedures for 2E explained   All questions answered with no further questions at this time.   Fall prevention and safety precautions discussed with patient.   1055  to procedure per bed

## 2025-05-08 NOTE — FLOWSHEET NOTE
05/08/25 1330   AVS Reviewed   AVS & discharge instructions reviewed with patient and/or representative? Yes   Reviewed instructions with Patient;Other (name and relationship in comment)  (spouse)   Level of Understanding Questions answered;Teach back completed;Verbalized understanding       Eliquis samples obtained from office from CAT Campos RN   Pt remains in NSR   Ambulated and taking soft food and liquids well   Denies needs   Discharged per wc per transport team with personal belongings with no needs

## 2025-05-08 NOTE — DISCHARGE INSTRUCTIONS
TRANSESOPHAGEAL ECHOCARDIOGRAM DISCHARGE INSTRUCTIONS    Medications:    Take your medications as ordered by your doctor.    Activities:     Rest and relax today. Have someone stay with you through the day  Do not drive for the remainder of the day    Food and Drink:    If your gag reflex has returned, try small sips of water. If you can swallow easily, you may drink clear liquids first, then resume your diet as tolerated   Your throat may feel slightly sore for a few hours, ice chips may help soothe your throat      Notify your physician or go to the emergency room if you have any of the following:    Chest pain  Severe abdominal pain  Bleeding that will not stop  Problems breathing, such as shortness of breath  Palpitations  Vomiting  Difficulty swallowing   Sore throat lasting longer than 2 days  Fever greater than 100 °F      Discharge Instructions for Cardioversion    Your healthcare provider performed a procedure called cardioversion. Your healthcare provider used a controlled electric shock or a medicine to briefly stop all electrical activity in your heart. This helped restore your heart’s normal rhythm. Here are some instructions to follow while you recover.    Home care  Because cardioversion typically requires sedation, you won't be able to drive home. You will need a ride. Wait at least 24 hours before driving a car or operating heavy machinery after receiving sedating medicines.    Don’t be alarmed if the skin on your chest is irritated or feels like it is sunburned. Your healthcare provider may prescribe a soothing lotion to relieve this discomfort. These minor symptoms will go away in a few days.    Ask your healthcare provider about medicines to keep your heart rhythm steady.    If you were prescribed medicine, take it as instructed by your healthcare provider. Don’t skip doses or take double doses. Cardioversion requires blood thinners for at least 4 weeks to prevent a delayed risk of stroke when

## 2025-05-20 NOTE — PATIENT INSTRUCTIONS
Will call you to schedule the ablation and go over the specific instructions.     You may receive a survey regarding the care you received during your visit.  Your input is valuable to us.  We encourage you to complete and return your survey.  We hope you will choose us in the future for your healthcare needs.  Thank you for choosing [facility]!    Your Medical Assistant Today:  Eugenia DO   Your RN Today:  Bobbi DO   Your Provider for Today: Dr. Mendez  Ph. [phone] opt 1  Fx. [phone]    Have A Great Day!

## 2025-05-21 ENCOUNTER — OFFICE VISIT (OUTPATIENT)
Dept: CARDIOLOGY CLINIC | Age: 60
End: 2025-05-21
Payer: COMMERCIAL

## 2025-05-21 VITALS
DIASTOLIC BLOOD PRESSURE: 73 MMHG | HEIGHT: 74 IN | BODY MASS INDEX: 40.43 KG/M2 | WEIGHT: 315 LBS | HEART RATE: 77 BPM | SYSTOLIC BLOOD PRESSURE: 130 MMHG | OXYGEN SATURATION: 96 %

## 2025-05-21 DIAGNOSIS — I48.91 ATRIAL FIBRILLATION, UNSPECIFIED TYPE (HCC): ICD-10-CM

## 2025-05-21 DIAGNOSIS — I10 HYPERTENSION, UNSPECIFIED TYPE: ICD-10-CM

## 2025-05-21 DIAGNOSIS — I48.19 PERSISTENT ATRIAL FIBRILLATION (HCC): Primary | ICD-10-CM

## 2025-05-21 DIAGNOSIS — G47.33 OSA (OBSTRUCTIVE SLEEP APNEA): ICD-10-CM

## 2025-05-21 DIAGNOSIS — Z79.01 CURRENT USE OF LONG TERM ANTICOAGULATION: ICD-10-CM

## 2025-05-21 DIAGNOSIS — E66.9 OBESITY (BMI 30-39.9): ICD-10-CM

## 2025-05-21 PROCEDURE — 3075F SYST BP GE 130 - 139MM HG: CPT | Performed by: INTERNAL MEDICINE

## 2025-05-21 PROCEDURE — 93000 ELECTROCARDIOGRAM COMPLETE: CPT | Performed by: INTERNAL MEDICINE

## 2025-05-21 PROCEDURE — 3078F DIAST BP <80 MM HG: CPT | Performed by: INTERNAL MEDICINE

## 2025-05-21 PROCEDURE — 99215 OFFICE O/P EST HI 40 MIN: CPT | Performed by: INTERNAL MEDICINE

## 2025-05-21 NOTE — PROGRESS NOTES
TABLET BY MOUTH EVERY DAY 90 tablet 3    aspirin 81 MG EC tablet Take 1 tablet by mouth daily      meloxicam (MOBIC) 15 MG tablet Take 1 tablet by mouth daily      spironolactone (ALDACTONE) 25 MG tablet Take 2 tablets by mouth daily 180 tablet 3    apixaban (ELIQUIS) 5 MG TABS tablet Take 1 tablet by mouth once for 1 dose 1 tablet 0     No current facility-administered medications for this visit.          Physical Examination:  Vitals:    05/21/25 0758   BP: 130/73   Pulse: 77   SpO2: 96%         Body mass index is Body mass index is 40.96 kg/m².   GENERAL: Alert and oriented. No distress.  EYES: No pallor or icterus.  ENT: No cyanosis.  HEART: Normal S1/S2. No murmur, rub or gallop.  LUNGS: Clear to auscultation.  ABDOMEN: Soft and non-tender.   EXTREMITIES: No lower extremity edema.  NEUROLOGICAL: Grossly normal.      Laboratory And Diagnostic Data  I have personally reviewed and interpreted the results of the following diagnostic testing    Lab Results   Component Value Date     05/08/2025    K 4.7 05/08/2025     05/08/2025    CO2 24 05/08/2025    BUN 21 05/08/2025    CREATININE 1.0 05/08/2025    GLUCOSE 96 05/08/2025    CALCIUM 9.0 05/08/2025    BILITOT 0.7 10/11/2017    ALKPHOS 62 10/11/2017    AST 14 10/11/2017    ALT 16 10/11/2017    LABGLOM 87 05/08/2025     Lab Results   Component Value Date    WBC 6.9 05/08/2025    HGB 13.7 (L) 05/08/2025    HCT 41.1 (L) 05/08/2025    MCV 91.3 05/08/2025     05/08/2025      Lab Results   Component Value Date    TSH 2.24 04/29/2025       ECHO:   Results for orders placed or performed during the hospital encounter of 04/18/25   Echo (TTE) complete (PRN contrast/bubble/strain/3D)   Result Value Ref Range    LV EDV A2C 69 mL    LV EDV A4C 70 mL    LV ESV A2C 31 mL    LV ESV A4C 31 mL    LV IVRT 70.0 ms    IVSd 1.0 0.6 - 1.0 cm    LVOT Peak Velocity 1.3 m/s    LVOT Peak Gradient 6 mmHg    LV Ejection Fraction A2C 55 %    LV Ejection Fraction A4C 56 %    EF

## 2025-05-22 ENCOUNTER — TELEPHONE (OUTPATIENT)
Dept: CARDIOLOGY CLINIC | Age: 60
End: 2025-05-22

## 2025-05-22 DIAGNOSIS — I48.91 ATRIAL FIBRILLATION, UNSPECIFIED TYPE (HCC): Primary | ICD-10-CM

## 2025-05-22 NOTE — TELEPHONE ENCOUNTER
Schedule the patient for an Afib ablation   Patient would like to have done in July  I did discuss with the patient that the July dates for cath lab/ physician are not released yet.   He probably wont hear back from me until next week.

## 2025-05-29 PROBLEM — I48.91 A-FIB (HCC): Status: ACTIVE | Noted: 2025-05-22

## 2025-05-29 NOTE — TELEPHONE ENCOUNTER
Procedure: Afib ablation   Date: 07.31.2025-  (sooner date offered pt preferred this date )  Arrival Time: 5am  Meds to Hold: Eliquis the evening prior,  Spironolactone the morning of the procedure.        Instructions verbalized to the patient.  Instructions emailed to the patient thru Anthony          [facility] [place] [facility]   [phone] Opt 2    Patient Instructions- Pacemaker/ ICD/ EP Procedures    Patient: Carl Paulson  Procedure: Afib ablation   Date of Procedure: 07.31.2025  Arrival Time: 5am (ER door)    Follow up Appointments:  Incision site check with Jared Caban- 08.06.2025 at 8:15am.   5-6 week post ablation visit-  09.03.2025 at 8am    Diet:   Nothing to eat or drink after midnight the night before your procedure is scheduled.  You may take your AM medications with a small sip of water the morning of the procedure (unless directed otherwise)  Medications:    Medications to Hold: Eliquis the evening prior and the morning after,  Spironolactone the morning of the procedure.   Continue on your regular medications unless otherwise instructed by the office.  Please notify us of ANY change in Allergies or Medications.  If you are a diabetic, do NOT take your diabetes medications the day of the procedure.   If you are using a CPAP, please bring your machine with you to the hospital.   Admission:   Please report to the second floor - [facility].   Please BRING YOUR ACTUAL BOTTLE OF MEDICATIONS with you to the hospital.   Bring your Photo ID & Insurance Cards.   Bring an overnight bag with pajamas, robe, toiletry items in case you spend the night, as most procedures do require a 23 hour stay.   Our staff will take care of any authorizations/ Pre- auth needs for your procedure if required.

## 2025-06-02 RX ORDER — SPIRONOLACTONE 25 MG/1
50 TABLET ORAL DAILY
Qty: 180 TABLET | Refills: 0 | Status: SHIPPED | OUTPATIENT
Start: 2025-06-02

## 2025-06-11 ENCOUNTER — OFFICE VISIT (OUTPATIENT)
Dept: CARDIOLOGY CLINIC | Age: 60
End: 2025-06-11
Payer: COMMERCIAL

## 2025-06-11 VITALS
HEIGHT: 74 IN | HEART RATE: 66 BPM | WEIGHT: 315 LBS | SYSTOLIC BLOOD PRESSURE: 126 MMHG | DIASTOLIC BLOOD PRESSURE: 80 MMHG | BODY MASS INDEX: 40.43 KG/M2 | OXYGEN SATURATION: 97 %

## 2025-06-11 DIAGNOSIS — I48.92 ATRIAL FLUTTER, UNSPECIFIED TYPE (HCC): Primary | ICD-10-CM

## 2025-06-11 PROCEDURE — 3074F SYST BP LT 130 MM HG: CPT | Performed by: INTERNAL MEDICINE

## 2025-06-11 PROCEDURE — 99214 OFFICE O/P EST MOD 30 MIN: CPT | Performed by: INTERNAL MEDICINE

## 2025-06-11 PROCEDURE — 3079F DIAST BP 80-89 MM HG: CPT | Performed by: INTERNAL MEDICINE

## 2025-06-11 NOTE — PROGRESS NOTES
pressure readings, will adjust patient's antihypertensive medications as needed accordingly     Disposition:   F/u with physician as scheduled, or sooner if needed.    Electronically signed by Ed Hugo MD FAC, [facility]

## 2025-07-29 NOTE — PRE-PROCEDURE INSTRUCTIONS
Message left with patient  Procedure is scheduled for Thursday, 7/31/25 admission time is 0500  Please arrive 15 minutes early  You will register on 2nd floor at the [facility]  NPO after midnight  Bring drivers license and insurance information  Wear comfortable clean clothes  Shower morning of and night before with liquid antibacterial soap  Remove jewelry   May have to stay overnight if have PTCA/stent - bring an overnight bag.  Leave valuables at home such as cash or credit cards  Bring medications in original bottles - do not take diabetic meds days of procedure.  It is OK to take BP and heart meds.    If your are on anticoagulants such as coumadin/warfarin, eliquis, xarelto or pradaxa - hold as directed by your Dr  Made aware of visitors limit to 2 at a time  Follow all instructions given by your physician  Please notify doctor office if you need to cancel or reschedule your procedure   needed at discharge

## 2025-07-31 ENCOUNTER — ANESTHESIA (OUTPATIENT)
Age: 60
End: 2025-07-31
Payer: COMMERCIAL

## 2025-07-31 ENCOUNTER — ANESTHESIA EVENT (OUTPATIENT)
Age: 60
End: 2025-07-31
Payer: COMMERCIAL

## 2025-07-31 ENCOUNTER — HOSPITAL ENCOUNTER (OUTPATIENT)
Age: 60
Setting detail: OUTPATIENT SURGERY
Discharge: HOME | End: 2025-07-31
Attending: INTERNAL MEDICINE | Admitting: INTERNAL MEDICINE
Payer: COMMERCIAL

## 2025-07-31 VITALS
RESPIRATION RATE: 13 BRPM | BODY MASS INDEX: 40.43 KG/M2 | DIASTOLIC BLOOD PRESSURE: 89 MMHG | OXYGEN SATURATION: 98 % | SYSTOLIC BLOOD PRESSURE: 129 MMHG | WEIGHT: 315 LBS | TEMPERATURE: 97 F | HEART RATE: 62 BPM | HEIGHT: 74 IN

## 2025-07-31 DIAGNOSIS — I48.91 A-FIB (HCC): ICD-10-CM

## 2025-07-31 LAB
ABO GROUP BLD: NORMAL
ACTIVATED CLOTTING TIME: 262 SECONDS (ref 1–150)
ACTIVATED CLOTTING TIME: 279 SECONDS (ref 1–150)
ACTIVATED CLOTTING TIME: 319 SECONDS (ref 1–150)
ANION GAP SERPL CALC-SCNC: 10 MEQ/L (ref 8–16)
APTT PPP: 31.8 SECONDS (ref 22–38)
BUN SERPL-MCNC: 24 MG/DL (ref 8–23)
CALCIUM SERPL-MCNC: 8.3 MG/DL (ref 8.8–10.2)
CHLORIDE SERPL-SCNC: 107 MEQ/L (ref 98–111)
CO2 SERPL-SCNC: 24 MEQ/L (ref 22–29)
CREAT SERPL-MCNC: 1.1 MG/DL (ref 0.7–1.2)
DEPRECATED RDW RBC AUTO: 45.2 FL (ref 35–45)
EKG ATRIAL RATE: 64 BPM
EKG P AXIS: 52 DEGREES
EKG P-R INTERVAL: 210 MS
EKG Q-T INTERVAL: 400 MS
EKG QRS DURATION: 86 MS
EKG QTC CALCULATION (BAZETT): 412 MS
EKG R AXIS: 62 DEGREES
EKG T AXIS: 33 DEGREES
EKG VENTRICULAR RATE: 64 BPM
ERYTHROCYTE [DISTWIDTH] IN BLOOD BY AUTOMATED COUNT: 13.2 % (ref 11.5–14.5)
GFR SERPL CREATININE-BSD FRML MDRD: 77 ML/MIN/1.73M2
GLUCOSE SERPL-MCNC: 87 MG/DL (ref 74–109)
HCT VFR BLD AUTO: 41.5 % (ref 42–52)
HGB BLD-MCNC: 13.7 GM/DL (ref 14–18)
IAT IGG-SP REAG SERPL QL: NORMAL
INR PPP: 1.08 (ref 0.85–1.13)
MCH RBC QN AUTO: 30.9 PG (ref 26–33)
MCHC RBC AUTO-ENTMCNC: 33 GM/DL (ref 32.2–35.5)
MCV RBC AUTO: 93.5 FL (ref 80–94)
PLATELET # BLD AUTO: 237 THOU/MM3 (ref 130–400)
PMV BLD AUTO: 9.7 FL (ref 9.4–12.4)
POTASSIUM SERPL-SCNC: 4.3 MEQ/L (ref 3.5–5.2)
PROTHROMBIN TIME: 12.6 SECONDS (ref 10–13.5)
RBC # BLD AUTO: 4.44 MILL/MM3 (ref 4.7–6.1)
RH BLD: NORMAL
SODIUM SERPL-SCNC: 141 MEQ/L (ref 135–145)
WBC # BLD AUTO: 6.1 THOU/MM3 (ref 4.8–10.8)

## 2025-07-31 PROCEDURE — 86900 BLOOD TYPING SEROLOGIC ABO: CPT

## 2025-07-31 PROCEDURE — 86885 COOMBS TEST INDIRECT QUAL: CPT

## 2025-07-31 PROCEDURE — 7100000001 HC PACU RECOVERY - ADDTL 15 MIN: Performed by: INTERNAL MEDICINE

## 2025-07-31 PROCEDURE — C1730 CATH, EP, 19 OR FEW ELECT: HCPCS | Performed by: INTERNAL MEDICINE

## 2025-07-31 PROCEDURE — 3700000000 HC ANESTHESIA ATTENDED CARE: Performed by: INTERNAL MEDICINE

## 2025-07-31 PROCEDURE — 85347 COAGULATION TIME ACTIVATED: CPT

## 2025-07-31 PROCEDURE — 93609 INTRA-VNTR MAPG TCHYCAR SITE: CPT | Performed by: INTERNAL MEDICINE

## 2025-07-31 PROCEDURE — 7100000011 HC PHASE II RECOVERY - ADDTL 15 MIN: Performed by: INTERNAL MEDICINE

## 2025-07-31 PROCEDURE — 2500000003 HC RX 250 WO HCPCS: Performed by: NURSE ANESTHETIST, CERTIFIED REGISTERED

## 2025-07-31 PROCEDURE — 93010 ELECTROCARDIOGRAM REPORT: CPT | Performed by: INTERNAL MEDICINE

## 2025-07-31 PROCEDURE — 3700000001 HC ADD 15 MINUTES (ANESTHESIA): Performed by: INTERNAL MEDICINE

## 2025-07-31 PROCEDURE — 2580000003 HC RX 258: Performed by: INTERNAL MEDICINE

## 2025-07-31 PROCEDURE — C1766 INTRO/SHEATH,STRBLE,NON-PEEL: HCPCS | Performed by: INTERNAL MEDICINE

## 2025-07-31 PROCEDURE — 85610 PROTHROMBIN TIME: CPT

## 2025-07-31 PROCEDURE — 93005 ELECTROCARDIOGRAM TRACING: CPT | Performed by: INTERNAL MEDICINE

## 2025-07-31 PROCEDURE — C1732 CATH, EP, DIAG/ABL, 3D/VECT: HCPCS | Performed by: INTERNAL MEDICINE

## 2025-07-31 PROCEDURE — 2709999900 HC NON-CHARGEABLE SUPPLY: Performed by: INTERNAL MEDICINE

## 2025-07-31 PROCEDURE — C1893 INTRO/SHEATH, FIXED,NON-PEEL: HCPCS | Performed by: INTERNAL MEDICINE

## 2025-07-31 PROCEDURE — 7100000010 HC PHASE II RECOVERY - FIRST 15 MIN: Performed by: INTERNAL MEDICINE

## 2025-07-31 PROCEDURE — 7100000000 HC PACU RECOVERY - FIRST 15 MIN: Performed by: INTERNAL MEDICINE

## 2025-07-31 PROCEDURE — 2720000010 HC SURG SUPPLY STERILE: Performed by: INTERNAL MEDICINE

## 2025-07-31 PROCEDURE — 93656 COMPRE EP EVAL ABLTJ ATR FIB: CPT | Performed by: INTERNAL MEDICINE

## 2025-07-31 PROCEDURE — 85027 COMPLETE CBC AUTOMATED: CPT

## 2025-07-31 PROCEDURE — 86901 BLOOD TYPING SEROLOGIC RH(D): CPT

## 2025-07-31 PROCEDURE — 6360000002 HC RX W HCPCS: Performed by: NURSE ANESTHETIST, CERTIFIED REGISTERED

## 2025-07-31 PROCEDURE — 80048 BASIC METABOLIC PNL TOTAL CA: CPT

## 2025-07-31 PROCEDURE — 85730 THROMBOPLASTIN TIME PARTIAL: CPT

## 2025-07-31 PROCEDURE — 36415 COLL VENOUS BLD VENIPUNCTURE: CPT

## 2025-07-31 PROCEDURE — C1759 CATH, INTRA ECHOCARDIOGRAPHY: HCPCS | Performed by: INTERNAL MEDICINE

## 2025-07-31 PROCEDURE — C1894 INTRO/SHEATH, NON-LASER: HCPCS | Performed by: INTERNAL MEDICINE

## 2025-07-31 RX ORDER — SODIUM CHLORIDE 9 MG/ML
INJECTION, SOLUTION INTRAVENOUS CONTINUOUS
Status: DISCONTINUED | OUTPATIENT
Start: 2025-07-31 | End: 2025-07-31 | Stop reason: HOSPADM

## 2025-07-31 RX ORDER — FENTANYL CITRATE 50 UG/ML
50 INJECTION, SOLUTION INTRAMUSCULAR; INTRAVENOUS EVERY 5 MIN PRN
Status: DISCONTINUED | OUTPATIENT
Start: 2025-07-31 | End: 2025-07-31 | Stop reason: HOSPADM

## 2025-07-31 RX ORDER — VASOPRESSIN 20 [USP'U]/ML
INJECTION, SOLUTION INTRAVENOUS
Status: DISCONTINUED | OUTPATIENT
Start: 2025-07-31 | End: 2025-07-31 | Stop reason: SDUPTHER

## 2025-07-31 RX ORDER — FENTANYL CITRATE 50 UG/ML
25 INJECTION, SOLUTION INTRAMUSCULAR; INTRAVENOUS EVERY 5 MIN PRN
Status: DISCONTINUED | OUTPATIENT
Start: 2025-07-31 | End: 2025-07-31 | Stop reason: HOSPADM

## 2025-07-31 RX ORDER — SODIUM CHLORIDE 0.9 % (FLUSH) 0.9 %
5-40 SYRINGE (ML) INJECTION PRN
Status: DISCONTINUED | OUTPATIENT
Start: 2025-07-31 | End: 2025-07-31 | Stop reason: HOSPADM

## 2025-07-31 RX ORDER — LIDOCAINE HYDROCHLORIDE 20 MG/ML
INJECTION, SOLUTION INFILTRATION; PERINEURAL
Status: DISCONTINUED | OUTPATIENT
Start: 2025-07-31 | End: 2025-07-31 | Stop reason: SDUPTHER

## 2025-07-31 RX ORDER — MIDAZOLAM HYDROCHLORIDE 1 MG/ML
INJECTION, SOLUTION INTRAMUSCULAR; INTRAVENOUS
Status: DISCONTINUED | OUTPATIENT
Start: 2025-07-31 | End: 2025-07-31 | Stop reason: SDUPTHER

## 2025-07-31 RX ORDER — SODIUM CHLORIDE 9 MG/ML
INJECTION, SOLUTION INTRAVENOUS PRN
Status: DISCONTINUED | OUTPATIENT
Start: 2025-07-31 | End: 2025-07-31 | Stop reason: HOSPADM

## 2025-07-31 RX ORDER — SODIUM CHLORIDE 0.9 % (FLUSH) 0.9 %
5-40 SYRINGE (ML) INJECTION EVERY 12 HOURS SCHEDULED
Status: DISCONTINUED | OUTPATIENT
Start: 2025-07-31 | End: 2025-07-31 | Stop reason: HOSPADM

## 2025-07-31 RX ORDER — PROPOFOL 10 MG/ML
INJECTION, EMULSION INTRAVENOUS
Status: DISCONTINUED | OUTPATIENT
Start: 2025-07-31 | End: 2025-07-31 | Stop reason: SDUPTHER

## 2025-07-31 RX ORDER — AMIODARONE HYDROCHLORIDE 200 MG/1
100 TABLET ORAL DAILY
Qty: 45 TABLET | Refills: 2 | Status: SHIPPED | OUTPATIENT
Start: 2025-07-31

## 2025-07-31 RX ORDER — FENTANYL CITRATE 50 UG/ML
INJECTION, SOLUTION INTRAMUSCULAR; INTRAVENOUS
Status: DISCONTINUED | OUTPATIENT
Start: 2025-07-31 | End: 2025-07-31 | Stop reason: SDUPTHER

## 2025-07-31 RX ORDER — PROTAMINE SULFATE 10 MG/ML
INJECTION, SOLUTION INTRAVENOUS
Status: DISCONTINUED | OUTPATIENT
Start: 2025-07-31 | End: 2025-07-31 | Stop reason: SDUPTHER

## 2025-07-31 RX ORDER — PANTOPRAZOLE SODIUM 40 MG/1
40 TABLET, DELAYED RELEASE ORAL
Qty: 30 TABLET | Refills: 0 | Status: SHIPPED | OUTPATIENT
Start: 2025-07-31

## 2025-07-31 RX ORDER — PHENYLEPHRINE HCL IN 0.9% NACL 1 MG/10 ML
SYRINGE (ML) INTRAVENOUS
Status: DISCONTINUED | OUTPATIENT
Start: 2025-07-31 | End: 2025-07-31 | Stop reason: SDUPTHER

## 2025-07-31 RX ORDER — ROCURONIUM BROMIDE 10 MG/ML
INJECTION, SOLUTION INTRAVENOUS
Status: DISCONTINUED | OUTPATIENT
Start: 2025-07-31 | End: 2025-07-31 | Stop reason: SDUPTHER

## 2025-07-31 RX ORDER — EPHEDRINE SULFATE/0.9% NACL/PF 25 MG/5 ML
SYRINGE (ML) INTRAVENOUS
Status: DISCONTINUED | OUTPATIENT
Start: 2025-07-31 | End: 2025-07-31 | Stop reason: SDUPTHER

## 2025-07-31 RX ORDER — HEPARIN SODIUM 1000 [USP'U]/ML
INJECTION, SOLUTION INTRAVENOUS; SUBCUTANEOUS
Status: DISCONTINUED | OUTPATIENT
Start: 2025-07-31 | End: 2025-07-31 | Stop reason: SDUPTHER

## 2025-07-31 RX ADMIN — PROPOFOL 150 MG: 10 INJECTION, EMULSION INTRAVENOUS at 07:47

## 2025-07-31 RX ADMIN — Medication 100 MCG: at 08:12

## 2025-07-31 RX ADMIN — VASOPRESSIN 2 UNITS: 20 INJECTION INTRAVENOUS at 08:17

## 2025-07-31 RX ADMIN — ROCURONIUM BROMIDE 50 MG: 10 INJECTION, SOLUTION INTRAVENOUS at 07:47

## 2025-07-31 RX ADMIN — SODIUM CHLORIDE: 9 INJECTION, SOLUTION INTRAVENOUS at 09:32

## 2025-07-31 RX ADMIN — VASOPRESSIN 2 UNITS: 20 INJECTION INTRAVENOUS at 08:27

## 2025-07-31 RX ADMIN — Medication 100 MCG: at 08:26

## 2025-07-31 RX ADMIN — HEPARIN SODIUM 5000 UNITS: 1000 INJECTION INTRAVENOUS; SUBCUTANEOUS at 09:19

## 2025-07-31 RX ADMIN — EPHEDRINE SULFATE 5 MG: 5 INJECTION INTRAVENOUS at 08:05

## 2025-07-31 RX ADMIN — Medication 100 MCG: at 09:30

## 2025-07-31 RX ADMIN — Medication 100 MCG: at 08:59

## 2025-07-31 RX ADMIN — FENTANYL CITRATE 25 MCG: 50 INJECTION, SOLUTION INTRAMUSCULAR; INTRAVENOUS at 10:01

## 2025-07-31 RX ADMIN — FENTANYL CITRATE 25 MCG: 50 INJECTION, SOLUTION INTRAMUSCULAR; INTRAVENOUS at 09:17

## 2025-07-31 RX ADMIN — ROCURONIUM BROMIDE 30 MG: 10 INJECTION, SOLUTION INTRAVENOUS at 08:23

## 2025-07-31 RX ADMIN — HEPARIN SODIUM 6000 UNITS: 1000 INJECTION INTRAVENOUS; SUBCUTANEOUS at 08:53

## 2025-07-31 RX ADMIN — Medication 100 MCG: at 07:51

## 2025-07-31 RX ADMIN — SODIUM CHLORIDE: 9 INJECTION, SOLUTION INTRAVENOUS at 05:32

## 2025-07-31 RX ADMIN — PROTAMINE SULFATE 50 MG: 10 INJECTION, SOLUTION INTRAVENOUS at 09:46

## 2025-07-31 RX ADMIN — Medication 200 MCG: at 08:04

## 2025-07-31 RX ADMIN — EPHEDRINE SULFATE 10 MG: 5 INJECTION INTRAVENOUS at 07:52

## 2025-07-31 RX ADMIN — HEPARIN SODIUM 25000 UNITS: 1000 INJECTION INTRAVENOUS; SUBCUTANEOUS at 08:30

## 2025-07-31 RX ADMIN — EPHEDRINE SULFATE 10 MG: 5 INJECTION INTRAVENOUS at 08:00

## 2025-07-31 RX ADMIN — LIDOCAINE HYDROCHLORIDE 100 MG: 20 INJECTION, SOLUTION INFILTRATION; PERINEURAL at 07:47

## 2025-07-31 RX ADMIN — Medication 200 MCG: at 08:15

## 2025-07-31 RX ADMIN — FENTANYL CITRATE 50 MCG: 50 INJECTION, SOLUTION INTRAMUSCULAR; INTRAVENOUS at 07:47

## 2025-07-31 RX ADMIN — Medication 200 MCG: at 09:44

## 2025-07-31 RX ADMIN — Medication 200 MCG: at 08:36

## 2025-07-31 RX ADMIN — Medication 100 MCG: at 09:34

## 2025-07-31 RX ADMIN — SUGAMMADEX 600 MG: 100 INJECTION, SOLUTION INTRAVENOUS at 09:49

## 2025-07-31 RX ADMIN — ROCURONIUM BROMIDE 20 MG: 10 INJECTION, SOLUTION INTRAVENOUS at 09:35

## 2025-07-31 RX ADMIN — VASOPRESSIN 2 UNITS: 20 INJECTION INTRAVENOUS at 08:45

## 2025-07-31 RX ADMIN — MIDAZOLAM 2 MG: 1 INJECTION INTRAMUSCULAR; INTRAVENOUS at 07:39

## 2025-07-31 RX ADMIN — Medication 100 MCG: at 08:43

## 2025-07-31 ASSESSMENT — PAIN - FUNCTIONAL ASSESSMENT: PAIN_FUNCTIONAL_ASSESSMENT: ACTIVITIES ARE NOT PREVENTED

## 2025-07-31 ASSESSMENT — PAIN DESCRIPTION - LOCATION: LOCATION: HIP

## 2025-07-31 ASSESSMENT — PAIN DESCRIPTION - ORIENTATION: ORIENTATION: RIGHT

## 2025-07-31 ASSESSMENT — PAIN DESCRIPTION - DESCRIPTORS: DESCRIPTORS: DISCOMFORT

## 2025-07-31 ASSESSMENT — PAIN SCALES - GENERAL: PAINLEVEL_OUTOF10: 2

## 2025-07-31 NOTE — FLOWSHEET NOTE
07/31/25 1230   AVS Reviewed   AVS & discharge instructions reviewed with patient and/or representative? Yes   Reviewed instructions with Patient;Other (name and relationship in comment)  (wife Niesha)   Level of Understanding Questions answered;Teach back completed;Verbalized understanding

## 2025-07-31 NOTE — PROGRESS NOTES
1008 Pt arrives to pacu from cath lab on room air. Pt appears to be in no acute distress at this time. Respirations easy and unlabored. Pt A&Ox4, following commands. Bilateral groin sites assessed with Cath Lab RN. CDI x2. No redness, swelling, bleeding or hematoma present. Distal pulses present. Pt denies any pain or nausea at this time. Pt placed on 2L NC. VSS.   1015 Bilateral groin sites assessed. No redness, swelling, bleeding or hematoma present. Distal pulses present. Pt having some discomfort in R hip from laying. 2/10. Pt states it is tolerable at this time.   1030 Bilateral groin sites assessed. No redness, swelling, bleeding or hematoma present. Distal pulses present. Report called to . Spoke with Nicky SANTANA. All questions and concerns addressed at this time.   1038 Pt meets criteria for discharge from pacu. Pt transported to  in stable condition. Transfer assessment completed with Nicky SANTANA.

## 2025-07-31 NOTE — ANESTHESIA POSTPROCEDURE EVALUATION
Department of Anesthesiology  Postprocedure Note    Patient: Carl Paulson  MRN: [id number]  YOB: 1965  Date of evaluation: 7/31/2025    Procedure Summary       Date: 07/31/25 Room / Location: [facility] CATH LAB 4 / [facility] CARDIAC CATH LAB    Anesthesia Start: 0738 Anesthesia Stop: 1012    Procedure: Ablation A-fib w complete ep study Diagnosis:       Paroxysmal atrial fibrillation (HCC)      (A-fib (HCC) [I48.91])    Providers: Suresh Mendez MD Responsible Provider: Dwain Watkins DO    Anesthesia Type: general ASA Status: 3            Anesthesia Type: No value filed.    Yamilex Phase I: Yamilex Score: 10    Yamilex Phase II:      Anesthesia Post Evaluation    Patient location during evaluation: PACU  Patient participation: complete - patient participated  Level of consciousness: awake and alert  Airway patency: patent  Nausea & Vomiting: no nausea  Cardiovascular status: blood pressure returned to baseline and hemodynamically stable  Respiratory status: acceptable and spontaneous ventilation  Hydration status: euvolemic  Pain management: adequate    There were no known notable events for this encounter.

## 2025-07-31 NOTE — ANESTHESIA PRE PROCEDURE
Department of Anesthesiology  Preprocedure Note       Name:  Carl Paulson   Age:  60 y.o.  :  1965                                          MRN:  [id number]         Date:  2025      Surgeon: Surgeon(s):  Suresh Mendez MD    Procedure: Procedure(s):  Ablation A-fib w complete ep study    Medications prior to admission:   Prior to Admission medications    Medication Sig Start Date End Date Taking? Authorizing Provider   spironolactone (ALDACTONE) 25 MG tablet TAKE 2 TABLETS BY MOUTH EVERY DAY 25  Yes Ed Hugo MD   metoprolol succinate (TOPROL XL) 50 MG extended release tablet Take 1.5 tablets by mouth daily   Yes Drew Barrett MD   apixaban (ELIQUIS) 5 MG TABS tablet Take 1 tablet by mouth 2 times daily 25  Yes Ed Hugo MD   amiodarone (CORDARONE) 200 MG tablet Take 1 tablet by mouth daily 25  Yes Ed Hugo MD   losartan (COZAAR) 50 MG tablet TAKE 1 TABLET BY MOUTH EVERY DAY 3/31/25  Yes Ed Hugo MD   aspirin 81 MG EC tablet Take 1 tablet by mouth daily   Yes Drew Barrett MD       Current medications:    Current Facility-Administered Medications   Medication Dose Route Frequency Provider Last Rate Last Admin   • sodium chloride flush 0.9 % injection 5-40 mL  5-40 mL IntraVENous 2 times per day Suresh Mendez MD       • sodium chloride flush 0.9 % injection 5-40 mL  5-40 mL IntraVENous PRN Suresh Mendez MD       • 0.9 % sodium chloride infusion   IntraVENous PRN Suresh Mendez MD       • 0.9 % sodium chloride infusion   IntraVENous Continuous Suresh Mendez MD 50 mL/hr at 25 0716 NoRateChange at 25 0716       Allergies:  No Known Allergies    Problem List:    Patient Active Problem List   Diagnosis Code   • Chest pain at rest R07.9   • Abnormal nuclear stress test R94.39   • Hypertension I10   • Acid reflux K21.9   • Atrial fibrillation (HCC) I48.91   • A-fib (HCC) I48.91       Past Medical History:        Diagnosis Date   • Asthma    •

## 2025-07-31 NOTE — H&P
[facility]  [facility] (EP)  [address] [address]  H&P and SEDATION/ANALGESIA     Patient demographics:  Date:   7/31/2025  Patient name: Carl Paulson  YOB: 1965  Sex: male   MRN:   [id number]    Reason for admission or planned procedure:  Atrial fibrillation, presents for ablation.     Code Status: Full Code    Consent:I have discussed with the patient and/or the patient representative the indication, alternatives, and the possible risks and/or complications of the planned procedure and the anesthesia methods. The patient and/or patient representative appear to understand and agree to proceed.      Brief clinical summary:  Please see recent H&P for full details.     Past Medical History:  Past Medical History:   Diagnosis Date    Asthma     Atrial fibrillation (HCC)     GERD (gastroesophageal reflux disease)     Hyperlipidemia     Hypertension     Obesity     Sleep apnea        Past Surgical History:  Past Surgical History:   Procedure Laterality Date    CARDIAC CATHETERIZATION  2020    No stents required. Dr Miles.Pre surgery requirement for gastric bypass surgery    CARDIOVERSION  05/08/2025    COLONOSCOPY  09/20/2016    GASTRIC BYPASS SURGERY      approximately 2017    JOINT REPLACEMENT Bilateral     2019        Allergies:   Allergies as of 05/29/2025    (No Known Allergies)     Additional information:       Medications     Current Facility-Administered Medications:     sodium chloride flush 0.9 % injection 5-40 mL, 5-40 mL, IntraVENous, 2 times per day, Suresh Mendez MD    sodium chloride flush 0.9 % injection 5-40 mL, 5-40 mL, IntraVENous, PRN, Suresh Mendez MD    0.9 % sodium chloride infusion, , IntraVENous, PRN, Suresh Mendez MD    0.9 % sodium chloride infusion, , IntraVENous, Continuous, Suresh Mendez MD, Last Rate: 50 mL/hr at 07/31/25 0738, NoRateChange at 07/31/25 0738    Facility-Administered Medications Ordered in Other Encounters:

## 2025-07-31 NOTE — DISCHARGE INSTRUCTIONS
Instructions:    Activity:  1. Do not normal activities except:        - No heavy lifting more than 10 pounds for 3 days        - No strenuous activity for 7 days.        - No driving for 24 hours.  2. You may shower in the morning, but no tub baths for 3 days.   3. Ask your doctor when you can return to work.  4. Remove the bandages from the puncture sites the next day if they have not fallen off.   5. You may cover the site with a regular bandage for another day to keep the site clean and dry.     Diet:      You may resume your normal diet.     Care of Puncture site:       1.  Examine the puncture site daily until well healed.        2. Some bruising is expected and will slowly disappear.       3. Minor pain/soreness is also normal.    Medications:        Follow the schedule of medicines given by your doctor.    Things to report to your doctor:       1. Fever, swelling, bleeding.       2. Redness at the puncture site.       3. Warm or hot sensation at puncture sites.       4. Purulence or drainage of fluid from the puncture sites.       5. Numbness, tingling, or coldness in the affected leg.    Appointments:       Call your doctor at the following number - [phone] to set up an an appointment for one month after your procedure    Call you doctor immediately:       1. If you have fever , drainage from your puncture site, persistent chest discomfort, or uncontrolled heart rate.        2. If you are unable to contact your doctor, go to the nearest emergency room.    If bleeding or swelling occur to neck or groin sites, apply firm pressure for 15 minutes.

## 2025-08-06 LAB — ECHO BSA: 2.75 M2

## 2025-08-07 ENCOUNTER — OFFICE VISIT (OUTPATIENT)
Dept: CARDIOLOGY CLINIC | Age: 60
End: 2025-08-07
Payer: COMMERCIAL

## 2025-08-07 VITALS
SYSTOLIC BLOOD PRESSURE: 118 MMHG | BODY MASS INDEX: 40.43 KG/M2 | HEIGHT: 74 IN | DIASTOLIC BLOOD PRESSURE: 86 MMHG | WEIGHT: 315 LBS | HEART RATE: 84 BPM

## 2025-08-07 DIAGNOSIS — I10 ESSENTIAL HYPERTENSION: ICD-10-CM

## 2025-08-07 DIAGNOSIS — I48.0 PAROXYSMAL ATRIAL FIBRILLATION (HCC): Primary | ICD-10-CM

## 2025-08-07 PROCEDURE — 3074F SYST BP LT 130 MM HG: CPT | Performed by: STUDENT IN AN ORGANIZED HEALTH CARE EDUCATION/TRAINING PROGRAM

## 2025-08-07 PROCEDURE — 3079F DIAST BP 80-89 MM HG: CPT | Performed by: STUDENT IN AN ORGANIZED HEALTH CARE EDUCATION/TRAINING PROGRAM

## 2025-08-07 PROCEDURE — 99214 OFFICE O/P EST MOD 30 MIN: CPT | Performed by: STUDENT IN AN ORGANIZED HEALTH CARE EDUCATION/TRAINING PROGRAM

## 2025-08-26 RX ORDER — PANTOPRAZOLE SODIUM 40 MG/1
40 TABLET, DELAYED RELEASE ORAL
Qty: 90 TABLET | Refills: 1 | Status: SHIPPED | OUTPATIENT
Start: 2025-08-26

## 2025-08-29 RX ORDER — SPIRONOLACTONE 25 MG/1
50 TABLET ORAL DAILY
Qty: 180 TABLET | Refills: 0 | Status: SHIPPED | OUTPATIENT
Start: 2025-08-29

## 2025-09-03 ENCOUNTER — OFFICE VISIT (OUTPATIENT)
Dept: CARDIOLOGY CLINIC | Age: 60
End: 2025-09-03
Payer: COMMERCIAL

## 2025-09-03 VITALS
BODY MASS INDEX: 40.43 KG/M2 | DIASTOLIC BLOOD PRESSURE: 74 MMHG | HEART RATE: 67 BPM | SYSTOLIC BLOOD PRESSURE: 134 MMHG | WEIGHT: 315 LBS | HEIGHT: 74 IN

## 2025-09-03 DIAGNOSIS — I48.0 PAROXYSMAL ATRIAL FIBRILLATION (HCC): Primary | ICD-10-CM

## 2025-09-03 DIAGNOSIS — I10 ESSENTIAL HYPERTENSION: ICD-10-CM

## 2025-09-03 PROCEDURE — 3075F SYST BP GE 130 - 139MM HG: CPT | Performed by: PHYSICIAN ASSISTANT

## 2025-09-03 PROCEDURE — 93000 ELECTROCARDIOGRAM COMPLETE: CPT | Performed by: PHYSICIAN ASSISTANT

## 2025-09-03 PROCEDURE — 99214 OFFICE O/P EST MOD 30 MIN: CPT | Performed by: PHYSICIAN ASSISTANT

## 2025-09-03 PROCEDURE — 3078F DIAST BP <80 MM HG: CPT | Performed by: PHYSICIAN ASSISTANT
